# Patient Record
Sex: MALE | Race: WHITE | URBAN - METROPOLITAN AREA
[De-identification: names, ages, dates, MRNs, and addresses within clinical notes are randomized per-mention and may not be internally consistent; named-entity substitution may affect disease eponyms.]

---

## 2017-08-03 PROBLEM — N20.1 URETERAL STONE: Status: ACTIVE | Noted: 2017-08-03

## 2017-08-17 ENCOUNTER — HOSPITAL ENCOUNTER (OUTPATIENT)
Dept: GENERAL RADIOLOGY | Age: 57
Discharge: HOME | End: 2017-08-17
Attending: SPECIALIST | Admitting: SPECIALIST

## 2017-08-17 DIAGNOSIS — N20.0 CALCULUS, KIDNEY: ICD-10-CM

## 2018-04-05 ENCOUNTER — HOSPITAL ENCOUNTER (OUTPATIENT)
Dept: OTHER | Age: 58
Discharge: HOME | End: 2018-04-05
Attending: UROLOGY | Admitting: UROLOGY

## 2018-04-11 LAB
STONE COMPOSITION: NORMAL
STONE DESCRIPTION: NORMAL
STONE MASS: 44
STONE NUMBER: 3
STONE SIZE: NORMAL

## 2025-01-14 ENCOUNTER — APPOINTMENT (OUTPATIENT)
Dept: CT IMAGING | Age: 65
End: 2025-01-14
Payer: MEDICARE

## 2025-01-14 ENCOUNTER — APPOINTMENT (OUTPATIENT)
Dept: GENERAL RADIOLOGY | Age: 65
End: 2025-01-14
Payer: MEDICARE

## 2025-01-14 ENCOUNTER — HOSPITAL ENCOUNTER (INPATIENT)
Age: 65
LOS: 3 days | Discharge: HOME | End: 2025-01-17
Admitting: INTERNAL MEDICINE
Payer: MEDICARE

## 2025-01-14 DIAGNOSIS — J11.1 INFLUENZA: ICD-10-CM

## 2025-01-14 DIAGNOSIS — I47.10 PAROXYSMAL SUPRAVENTRICULAR TACHYCARDIA (HCC): ICD-10-CM

## 2025-01-14 DIAGNOSIS — R07.9 CHEST PAIN, UNSPECIFIED TYPE: ICD-10-CM

## 2025-01-14 DIAGNOSIS — I47.10 SVT (SUPRAVENTRICULAR TACHYCARDIA) (HCC): Primary | ICD-10-CM

## 2025-01-14 DIAGNOSIS — I21.4 NSTEMI (NON-ST ELEVATED MYOCARDIAL INFARCTION) (HCC): ICD-10-CM

## 2025-01-14 DIAGNOSIS — N20.1 URETERAL STONE: ICD-10-CM

## 2025-01-14 LAB
ALBUMIN SERPL-MCNC: 4.8 G/DL (ref 3.4–5)
ALBUMIN/GLOB SERPL: 1.6 {RATIO} (ref 1.1–2.2)
ALP SERPL-CCNC: 69 U/L (ref 40–129)
ALT SERPL-CCNC: 73 U/L (ref 10–40)
ANION GAP SERPL CALCULATED.3IONS-SCNC: 14 MMOL/L (ref 9–17)
ANTI-XA UNFRAC HEPARIN: <0.1 IU/L
AST SERPL-CCNC: 78 U/L (ref 15–37)
BASOPHILS # BLD: 0.03 K/UL
BASOPHILS NFR BLD: 1 % (ref 0–1)
BILIRUB SERPL-MCNC: 0.7 MG/DL (ref 0–1)
BNP SERPL-MCNC: 1765 PG/ML (ref 0–125)
BUN SERPL-MCNC: 15 MG/DL (ref 7–20)
CALCIUM SERPL-MCNC: 9.8 MG/DL (ref 8.3–10.6)
CHLORIDE SERPL-SCNC: 99 MMOL/L (ref 99–110)
CO2 SERPL-SCNC: 24 MMOL/L (ref 21–32)
CREAT SERPL-MCNC: 1.2 MG/DL (ref 0.8–1.3)
D DIMER PPP FEU-MCNC: 2.8 UG/ML FEU (ref 0–0.46)
EOSINOPHIL # BLD: 0.01 K/UL
EOSINOPHILS RELATIVE PERCENT: 0 % (ref 0–3)
ERYTHROCYTE [DISTWIDTH] IN BLOOD BY AUTOMATED COUNT: 13.4 % (ref 11.7–14.9)
EST. AVERAGE GLUCOSE BLD GHB EST-MCNC: 139 MG/DL
GFR, ESTIMATED: 61 ML/MIN/1.73M2
GLUCOSE SERPL-MCNC: 160 MG/DL (ref 74–99)
HBA1C MFR BLD: 6.5 % (ref 4.2–6.3)
HCT VFR BLD AUTO: 54.5 % (ref 42–52)
HGB BLD-MCNC: 18.5 G/DL (ref 13.5–18)
IMM GRANULOCYTES # BLD AUTO: 0.02 K/UL
IMM GRANULOCYTES NFR BLD: 0 %
INFLUENZA A BY PCR: DETECTED
INFLUENZA B BY PCR: NOT DETECTED
INR PPP: 1.1
LYMPHOCYTES NFR BLD: 0.43 K/UL
LYMPHOCYTES RELATIVE PERCENT: 7 % (ref 24–44)
MCH RBC QN AUTO: 30.9 PG (ref 27–31)
MCHC RBC AUTO-ENTMCNC: 33.9 G/DL (ref 32–36)
MCV RBC AUTO: 91.1 FL (ref 78–100)
MONOCYTES NFR BLD: 1.24 K/UL
MONOCYTES NFR BLD: 19 % (ref 0–4)
NEUTROPHILS NFR BLD: 73 % (ref 36–66)
NEUTS SEG NFR BLD: 4.81 K/UL
PARTIAL THROMBOPLASTIN TIME: 33.5 SEC (ref 25.1–37.1)
PLATELET # BLD AUTO: 178 K/UL (ref 140–440)
PMV BLD AUTO: 10.7 FL (ref 7.5–11.1)
POTASSIUM SERPL-SCNC: 4.8 MMOL/L (ref 3.5–5.1)
PROT SERPL-MCNC: 7.9 G/DL (ref 6.4–8.2)
PROTHROMBIN TIME: 14.6 SEC (ref 11.7–14.5)
RBC # BLD AUTO: 5.98 M/UL (ref 4.6–6.2)
SARS-COV-2 RDRP RESP QL NAA+PROBE: NOT DETECTED
SODIUM SERPL-SCNC: 136 MMOL/L (ref 136–145)
SPECIMEN DESCRIPTION: NORMAL
TROPONIN I SERPL HS-MCNC: 171 NG/L (ref 0–22)
TROPONIN I SERPL HS-MCNC: 191 NG/L (ref 0–22)
TROPONIN I SERPL HS-MCNC: 229 NG/L (ref 0–22)
TSH SERPL DL<=0.05 MIU/L-ACNC: 0.98 UIU/ML (ref 0.27–4.2)
WBC OTHER # BLD: 6.5 K/UL (ref 4–10.5)

## 2025-01-14 PROCEDURE — 87635 SARS-COV-2 COVID-19 AMP PRB: CPT

## 2025-01-14 PROCEDURE — 94761 N-INVAS EAR/PLS OXIMETRY MLT: CPT

## 2025-01-14 PROCEDURE — 99285 EMERGENCY DEPT VISIT HI MDM: CPT

## 2025-01-14 PROCEDURE — 96374 THER/PROPH/DIAG INJ IV PUSH: CPT

## 2025-01-14 PROCEDURE — 85025 COMPLETE CBC W/AUTO DIFF WBC: CPT

## 2025-01-14 PROCEDURE — 6360000004 HC RX CONTRAST MEDICATION

## 2025-01-14 PROCEDURE — 80053 COMPREHEN METABOLIC PANEL: CPT

## 2025-01-14 PROCEDURE — 83036 HEMOGLOBIN GLYCOSYLATED A1C: CPT

## 2025-01-14 PROCEDURE — 85379 FIBRIN DEGRADATION QUANT: CPT

## 2025-01-14 PROCEDURE — 93005 ELECTROCARDIOGRAM TRACING: CPT

## 2025-01-14 PROCEDURE — 85610 PROTHROMBIN TIME: CPT

## 2025-01-14 PROCEDURE — 85730 THROMBOPLASTIN TIME PARTIAL: CPT

## 2025-01-14 PROCEDURE — 83880 ASSAY OF NATRIURETIC PEPTIDE: CPT

## 2025-01-14 PROCEDURE — 2500000003 HC RX 250 WO HCPCS

## 2025-01-14 PROCEDURE — 71275 CT ANGIOGRAPHY CHEST: CPT

## 2025-01-14 PROCEDURE — 84443 ASSAY THYROID STIM HORMONE: CPT

## 2025-01-14 PROCEDURE — 71046 X-RAY EXAM CHEST 2 VIEWS: CPT

## 2025-01-14 PROCEDURE — 87502 INFLUENZA DNA AMP PROBE: CPT

## 2025-01-14 PROCEDURE — 99223 1ST HOSP IP/OBS HIGH 75: CPT | Performed by: INTERNAL MEDICINE

## 2025-01-14 PROCEDURE — 85520 HEPARIN ASSAY: CPT

## 2025-01-14 PROCEDURE — 84484 ASSAY OF TROPONIN QUANT: CPT

## 2025-01-14 PROCEDURE — 36415 COLL VENOUS BLD VENIPUNCTURE: CPT

## 2025-01-14 PROCEDURE — 2060000000 HC ICU INTERMEDIATE R&B

## 2025-01-14 PROCEDURE — 6360000002 HC RX W HCPCS

## 2025-01-14 RX ORDER — HEPARIN SODIUM 10000 [USP'U]/100ML
5-30 INJECTION, SOLUTION INTRAVENOUS CONTINUOUS
Status: DISCONTINUED | OUTPATIENT
Start: 2025-01-14 | End: 2025-01-15

## 2025-01-14 RX ORDER — HEPARIN SODIUM 1000 [USP'U]/ML
80 INJECTION, SOLUTION INTRAVENOUS; SUBCUTANEOUS PRN
Status: DISCONTINUED | OUTPATIENT
Start: 2025-01-15 | End: 2025-01-15

## 2025-01-14 RX ORDER — DILTIAZEM HYDROCHLORIDE 5 MG/ML
15 INJECTION INTRAVENOUS ONCE
Status: COMPLETED | OUTPATIENT
Start: 2025-01-14 | End: 2025-01-14

## 2025-01-14 RX ORDER — 0.9 % SODIUM CHLORIDE 0.9 %
1000 INTRAVENOUS SOLUTION INTRAVENOUS ONCE
Status: DISCONTINUED | OUTPATIENT
Start: 2025-01-14 | End: 2025-01-14

## 2025-01-14 RX ORDER — PROMETHAZINE HYDROCHLORIDE 25 MG/ML
12.5 INJECTION, SOLUTION INTRAMUSCULAR; INTRAVENOUS ONCE
Status: DISCONTINUED | OUTPATIENT
Start: 2025-01-14 | End: 2025-01-14

## 2025-01-14 RX ORDER — HEPARIN SODIUM 1000 [USP'U]/ML
40 INJECTION, SOLUTION INTRAVENOUS; SUBCUTANEOUS PRN
Status: DISCONTINUED | OUTPATIENT
Start: 2025-01-15 | End: 2025-01-15

## 2025-01-14 RX ORDER — IOPAMIDOL 755 MG/ML
80 INJECTION, SOLUTION INTRAVASCULAR
Status: COMPLETED | OUTPATIENT
Start: 2025-01-14 | End: 2025-01-14

## 2025-01-14 RX ORDER — HEPARIN SODIUM 1000 [USP'U]/ML
80 INJECTION, SOLUTION INTRAVENOUS; SUBCUTANEOUS ONCE
Status: COMPLETED | OUTPATIENT
Start: 2025-01-14 | End: 2025-01-14

## 2025-01-14 RX ADMIN — DILTIAZEM HYDROCHLORIDE 15 MG: 5 INJECTION INTRAVENOUS at 16:56

## 2025-01-14 RX ADMIN — HEPARIN SODIUM 8200 UNITS: 1000 INJECTION INTRAVENOUS; SUBCUTANEOUS at 21:38

## 2025-01-14 RX ADMIN — IOPAMIDOL 80 ML: 755 INJECTION, SOLUTION INTRAVENOUS at 19:56

## 2025-01-14 RX ADMIN — HEPARIN SODIUM 18 UNITS/KG/HR: 10000 INJECTION, SOLUTION INTRAVENOUS at 21:41

## 2025-01-14 ASSESSMENT — PAIN - FUNCTIONAL ASSESSMENT: PAIN_FUNCTIONAL_ASSESSMENT: NONE - DENIES PAIN

## 2025-01-14 NOTE — CONSULTS
Name:  Kian Frias /Age/Sex: 1960  (64 y.o. male)   MRN & CSN:  [id number] & [id number] Admission Date/Time: 2025  4:11 PM   Location:   PCP: Rajeev Merida MD       Hospital Day: 1          Referring physician:  No admitting provider for patient encounter.         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    Information source: Patient    CC; tachycardia      HPI:   Thank you for involving me in taking  care of Kian Frias who  is a 64 y.o.year  Old male  Presents with no cardiac history has been sick for few days felt heart racing came to the ER was in SVT was given adenosine and now is converted to sinus  Has been having a cough on and off denies any recent travel                     Past medical history:    has a past medical history of Heart murmur, Hyperlipidemia, Kidney stone, and Sleep apnea.  Past surgical history:   has a past surgical history that includes Cystoscopy; Colonoscopy (); and Cystocopy (N/A, 2017).  Social History:   reports that he quit smoking about 18 years ago. His smoking use included cigarettes. He started smoking about 48 years ago. He has a 30 pack-year smoking history. He has never used smokeless tobacco. He reports current alcohol use. He reports that he does not use drugs.  Family history:  family history includes Diabetes in his mother; Heart Disease in his mother; High Blood Pressure in his father.    Allergies   Allergen Reactions    Zocor [Simvastatin] Other (See Comments)     Muscle pain       No current facility-administered medications for this encounter.    No current facility-administered medications for this encounter.     Current Outpatient Medications   Medication Sig Dispense Refill    aspirin 81 MG tablet Take 1 tablet by mouth daily      Multiple Vitamins-Minerals (THERAPEUTIC MULTIVITAMIN-MINERALS) tablet Take 1 tablet by mouth daily

## 2025-01-15 ENCOUNTER — APPOINTMENT (OUTPATIENT)
Dept: ULTRASOUND IMAGING | Age: 65
End: 2025-01-15
Payer: MEDICARE

## 2025-01-15 ENCOUNTER — APPOINTMENT (OUTPATIENT)
Dept: NON INVASIVE DIAGNOSTICS | Age: 65
End: 2025-01-15
Attending: INTERNAL MEDICINE
Payer: MEDICARE

## 2025-01-15 LAB
ABSOLUTE BANDS: 0.35 K/UL
ALBUMIN SERPL-MCNC: 4.2 G/DL (ref 3.4–5)
ALBUMIN/GLOB SERPL: 1.4 {RATIO} (ref 1.1–2.2)
ALP SERPL-CCNC: 57 U/L (ref 40–129)
ALT SERPL-CCNC: 57 U/L (ref 10–40)
ANION GAP SERPL CALCULATED.3IONS-SCNC: 13 MMOL/L (ref 9–17)
ANTI-XA UNFRAC HEPARIN: 0.1 IU/L
ANTI-XA UNFRAC HEPARIN: 0.83 IU/L
ANTI-XA UNFRAC HEPARIN: <0.1 IU/L
ANTI-XA UNFRAC HEPARIN: >1.1 IU/L
ARTERIAL PATENCY WRIST A: ABNORMAL
AST SERPL-CCNC: 66 U/L (ref 15–37)
BANDS: 6 %
BASOPHILS # BLD: 0 K/UL
BASOPHILS NFR BLD: 0 % (ref 0–1)
BILIRUB DIRECT SERPL-MCNC: 0.3 MG/DL (ref 0–0.3)
BILIRUB INDIRECT SERPL-MCNC: 0.4 MG/DL (ref 0–0.7)
BILIRUB SERPL-MCNC: 0.7 MG/DL (ref 0–1)
BODY TEMPERATURE: 37
BUN SERPL-MCNC: 18 MG/DL (ref 7–20)
CALCIUM SERPL-MCNC: 9.1 MG/DL (ref 8.3–10.6)
CHLORIDE SERPL-SCNC: 101 MMOL/L (ref 99–110)
CHOLEST SERPL-MCNC: 155 MG/DL (ref 125–199)
CO2 SERPL-SCNC: 23 MMOL/L (ref 21–32)
COHGB MFR BLD: 0.2 % (ref 0.5–1.5)
CREAT SERPL-MCNC: 1.1 MG/DL (ref 0.8–1.3)
ECHO AO ROOT DIAM: 2.4 CM
ECHO AO ROOT INDEX: 1.1 CM/M2
ECHO AV AREA PEAK VELOCITY: 2.1 CM2
ECHO AV AREA VTI: 3.1 CM2
ECHO AV AREA/BSA PEAK VELOCITY: 1 CM2/M2
ECHO AV AREA/BSA VTI: 1.4 CM2/M2
ECHO AV MEAN GRADIENT: 3 MMHG
ECHO AV MEAN VELOCITY: 0.9 M/S
ECHO AV PEAK GRADIENT: 5 MMHG
ECHO AV PEAK VELOCITY: 1.2 M/S
ECHO AV VELOCITY RATIO: 0.75
ECHO AV VTI: 16.9 CM
ECHO BSA: 2.24 M2
ECHO BSA: 2.24 M2
ECHO LA AREA 4C: 14.6 CM2
ECHO LA DIAMETER INDEX: 1.14 CM/M2
ECHO LA DIAMETER: 2.5 CM
ECHO LA MAJOR AXIS: 4.6 CM
ECHO LA TO AORTIC ROOT RATIO: 1.04
ECHO LA VOL MOD A4C: 36 ML (ref 18–58)
ECHO LA VOLUME INDEX MOD A4C: 16 ML/M2 (ref 16–34)
ECHO LV E' LATERAL VELOCITY: 8.2 CM/S
ECHO LV E' SEPTAL VELOCITY: 8.1 CM/S
ECHO LV EDV A4C: 56 ML
ECHO LV EDV INDEX A4C: 26 ML/M2
ECHO LV EF PHYSICIAN: 60 %
ECHO LV EJECTION FRACTION A4C: 57 %
ECHO LV ESV A4C: 24 ML
ECHO LV ESV INDEX A4C: 11 ML/M2
ECHO LV FRACTIONAL SHORTENING: 49 % (ref 28–44)
ECHO LV INTERNAL DIMENSION DIASTOLE INDEX: 1.6 CM/M2
ECHO LV INTERNAL DIMENSION DIASTOLIC: 3.5 CM (ref 4.2–5.9)
ECHO LV INTERNAL DIMENSION SYSTOLIC INDEX: 0.82 CM/M2
ECHO LV INTERNAL DIMENSION SYSTOLIC: 1.8 CM
ECHO LV IVSD: 1.2 CM (ref 0.6–1)
ECHO LV MASS 2D: 135.8 G (ref 88–224)
ECHO LV MASS INDEX 2D: 62 G/M2 (ref 49–115)
ECHO LV POSTERIOR WALL DIASTOLIC: 1.2 CM (ref 0.6–1)
ECHO LV RELATIVE WALL THICKNESS RATIO: 0.69
ECHO LVOT AREA: 2.8 CM2
ECHO LVOT AV VTI INDEX: 1.09
ECHO LVOT DIAM: 1.9 CM
ECHO LVOT MEAN GRADIENT: 2 MMHG
ECHO LVOT PEAK GRADIENT: 3 MMHG
ECHO LVOT PEAK VELOCITY: 0.9 M/S
ECHO LVOT STROKE VOLUME INDEX: 23.9 ML/M2
ECHO LVOT SV: 52.4 ML
ECHO LVOT VTI: 18.5 CM
ECHO MV A VELOCITY: 0.7 M/S
ECHO MV E VELOCITY: 0.49 M/S
ECHO MV E/A RATIO: 0.7
ECHO MV E/E' LATERAL: 5.98
ECHO MV E/E' RATIO (AVERAGED): 6.01
ECHO MV E/E' SEPTAL: 6.05
ECHO RV MID DIMENSION: 2.9 CM
EKG ATRIAL RATE: 82 BPM
EKG ATRIAL RATE: 85 BPM
EKG DIAGNOSIS: NORMAL
EKG P AXIS: 53 DEGREES
EKG P AXIS: 55 DEGREES
EKG P-R INTERVAL: 122 MS
EKG P-R INTERVAL: 148 MS
EKG Q-T INTERVAL: 286 MS
EKG Q-T INTERVAL: 336 MS
EKG Q-T INTERVAL: 382 MS
EKG QRS DURATION: 84 MS
EKG QRS DURATION: 86 MS
EKG QRS DURATION: 88 MS
EKG QTC CALCULATION (BAZETT): 392 MS
EKG QTC CALCULATION (BAZETT): 450 MS
EKG QTC CALCULATION (BAZETT): 454 MS
EKG R AXIS: -31 DEGREES
EKG R AXIS: -37 DEGREES
EKG R AXIS: -42 DEGREES
EKG T AXIS: 46 DEGREES
EKG T AXIS: 76 DEGREES
EKG T AXIS: 95 DEGREES
EKG VENTRICULAR RATE: 149 BPM
EKG VENTRICULAR RATE: 82 BPM
EKG VENTRICULAR RATE: 85 BPM
EOSINOPHIL # BLD: 0.06 K/UL
EOSINOPHILS RELATIVE PERCENT: 1 % (ref 0–3)
ERYTHROCYTE [DISTWIDTH] IN BLOOD BY AUTOMATED COUNT: 13.2 % (ref 11.7–14.9)
FIO2 ON VENT: 21 %
GFR, ESTIMATED: 71 ML/MIN/1.73M2
GLUCOSE SERPL-MCNC: 133 MG/DL (ref 74–99)
HCO3 ARTERIAL: 24.2 MMOL/L (ref 21–28)
HCT VFR BLD AUTO: 49.6 % (ref 42–52)
HDLC SERPL-MCNC: 45 MG/DL
HGB BLD-MCNC: 17.2 G/DL (ref 13.5–18)
LDLC SERPL CALC-MCNC: 98 MG/DL
LYMPHOCYTES NFR BLD: 1 K/UL
LYMPHOCYTES RELATIVE PERCENT: 17 % (ref 24–44)
MAGNESIUM SERPL-MCNC: 2 MG/DL (ref 1.8–2.4)
MCH RBC QN AUTO: 31.4 PG (ref 27–31)
MCHC RBC AUTO-ENTMCNC: 34.7 G/DL (ref 32–36)
MCV RBC AUTO: 90.7 FL (ref 78–100)
METHEMOGLOBIN: 0.5 % (ref 0.5–1.5)
MONOCYTES NFR BLD: 1.48 K/UL
MONOCYTES NFR BLD: 25 % (ref 0–4)
NEGATIVE BASE EXCESS, ART: 1.3 MMOL/L (ref 0–3)
NEUTROPHILS NFR BLD: 51 % (ref 36–66)
NEUTS SEG NFR BLD: 3.01 K/UL
OXYHGB MFR BLD: 94 %
PARTIAL THROMBOPLASTIN TIME: 198.3 SEC (ref 25.1–37.1)
PCO2 ARTERIAL: 43.3 MMHG (ref 35–48)
PH ARTERIAL: 7.37 (ref 7.35–7.45)
PLATELET # BLD AUTO: 170 K/UL (ref 140–440)
PLATELET ESTIMATE: ABNORMAL
PLATELET ESTIMATE: NORMAL
PMV BLD AUTO: 10.7 FL (ref 7.5–11.1)
PO2 ARTERIAL: 74.9 MMHG (ref 83–108)
POTASSIUM SERPL-SCNC: 3.9 MMOL/L (ref 3.5–5.1)
PROT SERPL-MCNC: 7.2 G/DL (ref 6.4–8.2)
RBC # BLD AUTO: 5.47 M/UL (ref 4.6–6.2)
RBC # BLD: NORMAL 10*6/UL
SODIUM SERPL-SCNC: 138 MMOL/L (ref 136–145)
TRIGL SERPL-MCNC: 62 MG/DL
TROPONIN I SERPL HS-MCNC: 225 NG/L (ref 0–22)
WBC # BLD: NORMAL 10*3/UL
WBC OTHER # BLD: 5.9 K/UL (ref 4–10.5)

## 2025-01-15 PROCEDURE — 82805 BLOOD GASES W/O2 SATURATION: CPT

## 2025-01-15 PROCEDURE — 2500000003 HC RX 250 WO HCPCS: Performed by: INTERNAL MEDICINE

## 2025-01-15 PROCEDURE — 99152 MOD SED SAME PHYS/QHP 5/>YRS: CPT | Performed by: INTERNAL MEDICINE

## 2025-01-15 PROCEDURE — 6360000002 HC RX W HCPCS: Performed by: INTERNAL MEDICINE

## 2025-01-15 PROCEDURE — 93010 ELECTROCARDIOGRAM REPORT: CPT | Performed by: INTERNAL MEDICINE

## 2025-01-15 PROCEDURE — 6370000000 HC RX 637 (ALT 250 FOR IP): Performed by: INTERNAL MEDICINE

## 2025-01-15 PROCEDURE — 85730 THROMBOPLASTIN TIME PARTIAL: CPT

## 2025-01-15 PROCEDURE — 2580000003 HC RX 258: Performed by: INTERNAL MEDICINE

## 2025-01-15 PROCEDURE — 2709999900 HC NON-CHARGEABLE SUPPLY: Performed by: INTERNAL MEDICINE

## 2025-01-15 PROCEDURE — 85025 COMPLETE CBC W/AUTO DIFF WBC: CPT

## 2025-01-15 PROCEDURE — 2500000003 HC RX 250 WO HCPCS

## 2025-01-15 PROCEDURE — C1894 INTRO/SHEATH, NON-LASER: HCPCS | Performed by: INTERNAL MEDICINE

## 2025-01-15 PROCEDURE — 83735 ASSAY OF MAGNESIUM: CPT

## 2025-01-15 PROCEDURE — 6360000004 HC RX CONTRAST MEDICATION: Performed by: INTERNAL MEDICINE

## 2025-01-15 PROCEDURE — 85520 HEPARIN ASSAY: CPT

## 2025-01-15 PROCEDURE — 99232 SBSQ HOSP IP/OBS MODERATE 35: CPT | Performed by: INTERNAL MEDICINE

## 2025-01-15 PROCEDURE — 93458 L HRT ARTERY/VENTRICLE ANGIO: CPT | Performed by: INTERNAL MEDICINE

## 2025-01-15 PROCEDURE — 85347 COAGULATION TIME ACTIVATED: CPT | Performed by: INTERNAL MEDICINE

## 2025-01-15 PROCEDURE — 80053 COMPREHEN METABOLIC PANEL: CPT

## 2025-01-15 PROCEDURE — 37799 UNLISTED PX VASCULAR SURGERY: CPT

## 2025-01-15 PROCEDURE — 94761 N-INVAS EAR/PLS OXIMETRY MLT: CPT

## 2025-01-15 PROCEDURE — 84484 ASSAY OF TROPONIN QUANT: CPT

## 2025-01-15 PROCEDURE — 93306 TTE W/DOPPLER COMPLETE: CPT

## 2025-01-15 PROCEDURE — APPNB15 APP NON BILLABLE TIME 0-15 MINS

## 2025-01-15 PROCEDURE — C1769 GUIDE WIRE: HCPCS | Performed by: INTERNAL MEDICINE

## 2025-01-15 PROCEDURE — 2700000000 HC OXYGEN THERAPY PER DAY

## 2025-01-15 PROCEDURE — 93306 TTE W/DOPPLER COMPLETE: CPT | Performed by: INTERNAL MEDICINE

## 2025-01-15 PROCEDURE — 93005 ELECTROCARDIOGRAM TRACING: CPT | Performed by: INTERNAL MEDICINE

## 2025-01-15 PROCEDURE — B2151ZZ FLUOROSCOPY OF LEFT HEART USING LOW OSMOLAR CONTRAST: ICD-10-PCS | Performed by: INTERNAL MEDICINE

## 2025-01-15 PROCEDURE — 36415 COLL VENOUS BLD VENIPUNCTURE: CPT

## 2025-01-15 PROCEDURE — 1200000000 HC SEMI PRIVATE

## 2025-01-15 PROCEDURE — 82248 BILIRUBIN DIRECT: CPT

## 2025-01-15 PROCEDURE — 80061 LIPID PANEL: CPT

## 2025-01-15 PROCEDURE — B2111ZZ FLUOROSCOPY OF MULTIPLE CORONARY ARTERIES USING LOW OSMOLAR CONTRAST: ICD-10-PCS | Performed by: INTERNAL MEDICINE

## 2025-01-15 PROCEDURE — 6370000000 HC RX 637 (ALT 250 FOR IP)

## 2025-01-15 PROCEDURE — 4A023N7 MEASUREMENT OF CARDIAC SAMPLING AND PRESSURE, LEFT HEART, PERCUTANEOUS APPROACH: ICD-10-PCS | Performed by: INTERNAL MEDICINE

## 2025-01-15 RX ORDER — ONDANSETRON 4 MG/1
4 TABLET, ORALLY DISINTEGRATING ORAL EVERY 8 HOURS PRN
Status: DISCONTINUED | OUTPATIENT
Start: 2025-01-15 | End: 2025-01-17 | Stop reason: HOSPADM

## 2025-01-15 RX ORDER — SODIUM CHLORIDE 0.9 % (FLUSH) 0.9 %
5-40 SYRINGE (ML) INJECTION EVERY 12 HOURS SCHEDULED
Status: DISCONTINUED | OUTPATIENT
Start: 2025-01-15 | End: 2025-01-17 | Stop reason: HOSPADM

## 2025-01-15 RX ORDER — SODIUM CHLORIDE 0.9 % (FLUSH) 0.9 %
5-40 SYRINGE (ML) INJECTION PRN
Status: DISCONTINUED | OUTPATIENT
Start: 2025-01-15 | End: 2025-01-17 | Stop reason: HOSPADM

## 2025-01-15 RX ORDER — SODIUM CHLORIDE 9 MG/ML
INJECTION, SOLUTION INTRAVENOUS PRN
Status: DISCONTINUED | OUTPATIENT
Start: 2025-01-15 | End: 2025-01-17 | Stop reason: HOSPADM

## 2025-01-15 RX ORDER — ACETAMINOPHEN 325 MG/1
650 TABLET ORAL EVERY 6 HOURS PRN
Status: DISCONTINUED | OUTPATIENT
Start: 2025-01-15 | End: 2025-01-17 | Stop reason: HOSPADM

## 2025-01-15 RX ORDER — ASPIRIN 81 MG/1
81 TABLET, CHEWABLE ORAL DAILY
Status: DISCONTINUED | OUTPATIENT
Start: 2025-01-15 | End: 2025-01-17 | Stop reason: HOSPADM

## 2025-01-15 RX ORDER — IOPAMIDOL 755 MG/ML
INJECTION, SOLUTION INTRAVASCULAR PRN
Status: DISCONTINUED | OUTPATIENT
Start: 2025-01-15 | End: 2025-01-15 | Stop reason: HOSPADM

## 2025-01-15 RX ORDER — OSELTAMIVIR PHOSPHATE 75 MG/1
75 CAPSULE ORAL 2 TIMES DAILY
Status: DISCONTINUED | OUTPATIENT
Start: 2025-01-15 | End: 2025-01-17 | Stop reason: HOSPADM

## 2025-01-15 RX ORDER — HEPARIN SODIUM 200 [USP'U]/100ML
INJECTION, SOLUTION INTRAVENOUS PRN
Status: DISCONTINUED | OUTPATIENT
Start: 2025-01-15 | End: 2025-01-15 | Stop reason: HOSPADM

## 2025-01-15 RX ORDER — POTASSIUM CHLORIDE 7.45 MG/ML
10 INJECTION INTRAVENOUS PRN
Status: DISCONTINUED | OUTPATIENT
Start: 2025-01-15 | End: 2025-01-17 | Stop reason: HOSPADM

## 2025-01-15 RX ORDER — ACETAMINOPHEN 325 MG/1
650 TABLET ORAL EVERY 4 HOURS PRN
Status: DISCONTINUED | OUTPATIENT
Start: 2025-01-15 | End: 2025-01-17 | Stop reason: HOSPADM

## 2025-01-15 RX ORDER — DILTIAZEM HYDROCHLORIDE 30 MG/1
30 TABLET, FILM COATED ORAL EVERY 6 HOURS SCHEDULED
Status: DISCONTINUED | OUTPATIENT
Start: 2025-01-15 | End: 2025-01-15

## 2025-01-15 RX ORDER — HEPARIN SODIUM 1000 [USP'U]/ML
4000 INJECTION, SOLUTION INTRAVENOUS; SUBCUTANEOUS PRN
Status: DISCONTINUED | OUTPATIENT
Start: 2025-01-15 | End: 2025-01-16

## 2025-01-15 RX ORDER — HEPARIN SODIUM 10000 [USP'U]/100ML
5-30 INJECTION, SOLUTION INTRAVENOUS CONTINUOUS
Status: DISCONTINUED | OUTPATIENT
Start: 2025-01-15 | End: 2025-01-16

## 2025-01-15 RX ORDER — BENZONATATE 100 MG/1
100 CAPSULE ORAL 3 TIMES DAILY PRN
Status: DISCONTINUED | OUTPATIENT
Start: 2025-01-15 | End: 2025-01-17 | Stop reason: HOSPADM

## 2025-01-15 RX ORDER — HEPARIN SODIUM 1000 [USP'U]/ML
2000 INJECTION, SOLUTION INTRAVENOUS; SUBCUTANEOUS PRN
Status: DISCONTINUED | OUTPATIENT
Start: 2025-01-15 | End: 2025-01-16

## 2025-01-15 RX ORDER — FENTANYL CITRATE 50 UG/ML
INJECTION, SOLUTION INTRAMUSCULAR; INTRAVENOUS PRN
Status: DISCONTINUED | OUTPATIENT
Start: 2025-01-15 | End: 2025-01-15 | Stop reason: HOSPADM

## 2025-01-15 RX ORDER — POLYETHYLENE GLYCOL 3350 17 G/17G
17 POWDER, FOR SOLUTION ORAL DAILY PRN
Status: DISCONTINUED | OUTPATIENT
Start: 2025-01-15 | End: 2025-01-17 | Stop reason: HOSPADM

## 2025-01-15 RX ORDER — 0.9 % SODIUM CHLORIDE 0.9 %
500 INTRAVENOUS SOLUTION INTRAVENOUS ONCE
Status: COMPLETED | OUTPATIENT
Start: 2025-01-15 | End: 2025-01-15

## 2025-01-15 RX ORDER — POTASSIUM CHLORIDE 1500 MG/1
40 TABLET, EXTENDED RELEASE ORAL PRN
Status: DISCONTINUED | OUTPATIENT
Start: 2025-01-15 | End: 2025-01-17 | Stop reason: HOSPADM

## 2025-01-15 RX ORDER — ACETAMINOPHEN 650 MG/1
650 SUPPOSITORY RECTAL EVERY 6 HOURS PRN
Status: DISCONTINUED | OUTPATIENT
Start: 2025-01-15 | End: 2025-01-17 | Stop reason: HOSPADM

## 2025-01-15 RX ORDER — METOPROLOL TARTRATE 25 MG/1
25 TABLET, FILM COATED ORAL 2 TIMES DAILY
Status: DISCONTINUED | OUTPATIENT
Start: 2025-01-15 | End: 2025-01-16

## 2025-01-15 RX ORDER — MIDAZOLAM HYDROCHLORIDE 1 MG/ML
INJECTION, SOLUTION INTRAMUSCULAR; INTRAVENOUS PRN
Status: DISCONTINUED | OUTPATIENT
Start: 2025-01-15 | End: 2025-01-15 | Stop reason: HOSPADM

## 2025-01-15 RX ORDER — 0.9 % SODIUM CHLORIDE 0.9 %
INTRAVENOUS SOLUTION INTRAVENOUS CONTINUOUS PRN
Status: COMPLETED | OUTPATIENT
Start: 2025-01-15 | End: 2025-01-15

## 2025-01-15 RX ORDER — HYDRALAZINE HYDROCHLORIDE 20 MG/ML
10 INJECTION INTRAMUSCULAR; INTRAVENOUS EVERY 10 MIN PRN
Status: DISCONTINUED | OUTPATIENT
Start: 2025-01-15 | End: 2025-01-17 | Stop reason: HOSPADM

## 2025-01-15 RX ORDER — ONDANSETRON 2 MG/ML
4 INJECTION INTRAMUSCULAR; INTRAVENOUS EVERY 6 HOURS PRN
Status: DISCONTINUED | OUTPATIENT
Start: 2025-01-15 | End: 2025-01-17 | Stop reason: HOSPADM

## 2025-01-15 RX ORDER — MAGNESIUM SULFATE IN WATER 40 MG/ML
2000 INJECTION, SOLUTION INTRAVENOUS PRN
Status: DISCONTINUED | OUTPATIENT
Start: 2025-01-15 | End: 2025-01-17 | Stop reason: HOSPADM

## 2025-01-15 RX ADMIN — HEPARIN SODIUM 9 UNITS/KG/HR: 10000 INJECTION, SOLUTION INTRAVENOUS at 18:49

## 2025-01-15 RX ADMIN — SODIUM CHLORIDE, PRESERVATIVE FREE 10 ML: 5 INJECTION INTRAVENOUS at 09:42

## 2025-01-15 RX ADMIN — SODIUM CHLORIDE 500 ML: 9 INJECTION, SOLUTION INTRAVENOUS at 16:19

## 2025-01-15 RX ADMIN — SODIUM CHLORIDE, PRESERVATIVE FREE 10 ML: 5 INJECTION INTRAVENOUS at 20:42

## 2025-01-15 RX ADMIN — METOPROLOL TARTRATE 25 MG: 25 TABLET, FILM COATED ORAL at 20:41

## 2025-01-15 RX ADMIN — DILTIAZEM HYDROCHLORIDE 30 MG: 30 TABLET, FILM COATED ORAL at 10:01

## 2025-01-15 RX ADMIN — OSELTAMIVIR PHOSPHATE 75 MG: 75 CAPSULE ORAL at 01:21

## 2025-01-15 RX ADMIN — BENZONATATE 100 MG: 100 CAPSULE ORAL at 01:21

## 2025-01-15 RX ADMIN — HEPARIN SODIUM 15 UNITS/KG/HR: 10000 INJECTION, SOLUTION INTRAVENOUS at 05:41

## 2025-01-15 RX ADMIN — OSELTAMIVIR PHOSPHATE 75 MG: 75 CAPSULE ORAL at 10:01

## 2025-01-15 RX ADMIN — OSELTAMIVIR PHOSPHATE 75 MG: 75 CAPSULE ORAL at 20:41

## 2025-01-15 RX ADMIN — HEPARIN SODIUM 9 UNITS/KG/HR: 10000 INJECTION, SOLUTION INTRAVENOUS at 08:48

## 2025-01-15 RX ADMIN — ASPIRIN 81 MG CHEWABLE TABLET 81 MG: 81 TABLET CHEWABLE at 11:36

## 2025-01-15 ASSESSMENT — LIFESTYLE VARIABLES
HOW MANY STANDARD DRINKS CONTAINING ALCOHOL DO YOU HAVE ON A TYPICAL DAY: PATIENT DOES NOT DRINK
HOW OFTEN DO YOU HAVE A DRINK CONTAINING ALCOHOL: NEVER

## 2025-01-15 ASSESSMENT — PAIN - FUNCTIONAL ASSESSMENT
PAIN_FUNCTIONAL_ASSESSMENT: NONE - DENIES PAIN
PAIN_FUNCTIONAL_ASSESSMENT: NONE - DENIES PAIN

## 2025-01-15 NOTE — ED PROVIDER NOTES
Patient care was assumed from Dr. Philippe during shift change this evening.  I supposed to follow-up CT pulmonary angiogram for PE which is pending during shift change.  Patient came in the emergency department with rapid heart rate with chest pain.  He apparently was diagnosed with SVT but is spontaneously cardioverted to normal sinus rhythm.  He is having troponin that is trending up and after consultation with the cardiologist patient is heparinized already.  CT is concerned that her left official report but per my review I do not see central PE.  At this point the plan is to admit the patient by the hospitalist service for further care recommendations.  Apart from dry cough patient is currently asymptomatic denying chest or abdominal pain or shortness of breath.  He does not admit to leg pain or leg swelling either     Adam Dunlap MD  01/14/25 5327    
embolism based off of it.  Will get CTA.  CTA was without findings of acute pulmonary embolism.  Discussed with hospitalist and will admit.    ED Course as of 01/18/25 0701   Tue Jan 14, 2025 1705 Discussed with cardiology, evaluated patient.  Recommended inpatient and they will consult EP [CC]   1725 After Cardizem, patient on reevaluation heart rate is improved.  Now heart rate in the 77-85.  Normal sinus rhythm on monitor. [CC]   1734 Troponin, High Sensitivity(!!): 171 [CC]   1804 D-Dimer, Quant(!): 2.80  Will get CTA of chest [CC]   1811 Influenza A by PCR(!): DETECTED [CC]   2106 Troponin, High Sensitivity(!!): 229  Discussed up trending trop with cardiology will start heparin. [CC]      ED Course User Index  [CC] Vishnu Philippe, DO     Troponins were trending up, I discussed this with cardiology and we will start on heparin drip.    History from : Patient    Limitations to history : None    Patient was given the following medications:  Medications   dilTIAZem injection 15 mg (15 mg IntraVENous Given 1/14/25 1656)   iopamidol (ISOVUE-370) 76 % injection 80 mL (80 mLs IntraVENous Given 1/14/25 1956)   heparin (porcine) injection 8,200 Units (8,200 Units IntraVENous Given 1/14/25 2138)   sodium chloride 0.9 % bolus (250 mLs IntraVENous New Bag 1/15/25 1423)   sodium chloride 0.9 % bolus 500 mL (0 mLs IntraVENous Stopped 1/15/25 1915)   enoxaparin (LOVENOX) injection 100 mg (100 mg SubCUTAneous Given 1/16/25 1250)   enoxaparin (LOVENOX) injection 100 mg (100 mg SubCUTAneous Given 1/17/25 0820)       Independent Imaging Interpretation by me:   CXR - without evidence of acute cardiopulmonary disease process      EKG (if obtained): (All EKG's are interpreted by myself in the absence of a cardiologist)   EKG as interpreted by me  1623  SVT at 149 bpm  Left axis deviation  Likely LVH  No STEMI    Repeat EKG  1705  Normal sinus rhythm at 82 bpm  Left axis deviation  Increased voltage suggesting LVH  Nonspecific T

## 2025-01-15 NOTE — PROGRESS NOTES
4 Eyes Skin Assessment     NAME:  Kian Frias  YOB: 1960  MEDICAL RECORD NUMBER:  [id number]    The patient is being assessed for  Admission    I agree that at least one RN has performed a thorough Head to Toe Skin Assessment on the patient. ALL assessment sites listed below have been assessed.      Areas assessed by both nurses:    Head, Face, Ears, Shoulders, Back, Chest, Arms, Elbows, Hands, Sacrum. Buttock, Coccyx, Ischium, Legs. Feet and Heels, and Under Medical Devices         Does the Patient have a Wound? No noted wound(s)       Fred Prevention initiated by RN: No  Wound Care Orders initiated by RN: No    Pressure Injury (Stage 3,4, Unstageable, DTI, NWPT, and Complex wounds) if present, place Wound referral order by RN under : No    New Ostomies, if present place, Ostomy referral order under : No     Nurse 1 eSignature: Electronically signed by Pippa Murdock RN on 1/15/25 at 12:39 PM EST    **SHARE this note so that the co-signing nurse can place an eSignature**    Nurse 2 eSignature: Electronically signed by Romi Aponte RN on 1/15/25 at 12:40 PM EST

## 2025-01-15 NOTE — ED NOTES
Pt resting in bed comfortably w/ eyes open. Pt is AxO x3 during assessment. Pt sating at 98% on 2 L NC, 80s HR, on monitor. Pt denies any CP/SOB/generalized pain during assessment. VSS. Will continue with current plan of care.

## 2025-01-15 NOTE — PROGRESS NOTES
Plan cath  Alternates and risk of the procedure were dicussed in detail  Patient is in agreement to proceed  Mallampati is 2  ASA is  3

## 2025-01-15 NOTE — PROGRESS NOTES
Spiritual Health History and Assessment/Progress Note  [facility]    Spiritual/Emotional Needs,  ,  ,      Name: Kian Frias MRN: [id number]    Age: 64 y.o.     Sex: male   Language: English   Judaism: None   SVT (supraventricular tachycardia) (HCC)     Date: 1/15/2025            Total Time Calculated: 6 min              Spiritual Assessment began in [facility] ICU STEPDOWN        Referral/Consult From: Rounding   Encounter Overview/Reason: Spiritual/Emotional Needs  Service Provided For: Patient and family together    Lelo, Belief, Meaning:   Patient identifies as spiritual  Family/Friends identify as spiritual      Importance and Influence:  Patient has spiritual/personal beliefs that influence decisions regarding their health  Family/Friends have spiritual/personal beliefs that influence decisions regarding the patient's health    Community:  Patient feels well-supported. Support system includes: Parent/s and Extended family  Family/Friends feel well-supported. Support system includes: Parent/s and Extended family    Assessment and Plan of Care:     Patient Interventions include: Facilitated expression of thoughts and feelings  Family/Friends Interventions include: Facilitated expression of thoughts and feelings    Patient Plan of Care: Spiritual Care available upon further referral  Family/Friends Plan of Care: Spiritual Care available upon further referral    Electronically signed by Chaplain Curtis on 1/15/2025 at 3:52 PM

## 2025-01-15 NOTE — ED NOTES
Pt resting on cart, denies needs at this time.  Pt denies CP/SOB at this time; resting comfortably

## 2025-01-15 NOTE — PROGRESS NOTES
[place] [address]  Phone: [phone]    Fax [phone]                  Samra Alamo MD, [facility]       Oscar Del Toro MD, [facility]  Katey Lange MD, [facility]    MD Yuriy Lincoln MD Tariq Rizvi, MD Bilal Alam, MD Dr. Waseem Sajjad MD Melissa Kellis, APRRHETT Sanchez, APRRHETT Romero, APRRHETT Santos, APRN  TIAN [facility]    CARDIOLOGY  NOTE      Name:  Kian Frias /Age/Sex: 1960  (64 y.o. male)   MRN & CSN:  [id number] & [id number] Admission Date/Time: 2025  4:11 PM   Location:  ED27/ED-27 PCP: Rajeev Merida MD       Hospital Day: 2    - Cardiology consult is for: SVT      ASSESSMENT/ PLAN:  Supraventricular tachycardia, likely AVNRT  Elevated troponin  -No arrhythmia overnight  -Goal potassium 4.0-4.5, magnesium 2.0-2.2. Replete per protocol   -TSH within normal limits  -EKG is nonischemic.  Troponin initially 171 and noris to 229  -No prior cardiac history, however does have family history of CAD  -Echocardiogram showing %, no wall motion abnormalities  -Will plan for heart catheterization today.  -Start Cardizem 30 mg every 6 hours  -Heparin drip.  Start aspirin  Influenza  -Likely provoked SVT  New onset diabetes mellitus  -Hemoglobin A1c 6.5 today  History of tobacco abuse: Has not smoked for over 15 years          Subjective:  Kian is a 64 y.o.year old     Educated patient on need for left heart catheterization.  Procedure was explained in detail as well as risks and benefits.  Patient voiced understanding and was agreeable to undergoing procedure.  Consent was obtained.     Plan for left heart catheterization today       Objective: Temperature:  Current - Temp: 98.3 °F (36.8 °C); Max - Temp  Av.3 °F (36.8 °C)  Min: 98.2 °F (36.8 °C)  Max: 98.3 °F (36.8 °C)    Respiratory Rate : Resp  Av.5  Min: 15  Max: 36    Pulse  edema  Pulses; palpable  Neuro: grossly normal      MEDICAL DECISION MAKING;    Pt assessed , chart reviewed, patient examined examined , all available data was reviewed, following is the plan which was discussed with NINA as well:    Patient's troponin are markedly elevated and patient had some chest discomfort and will proceed with cardiac catheterization    Supraventricular tachycardia probably AVNRT converted to sinus with adenosine at the present time he is in sinus rhythm we will continue to monitor  Cardizem added will continue     Will also rule out any other possible etiologies such as pulmonary embolism with a CTA chest     Will obtain echocardiogram for further clarification and check ejection fraction as well     Serial troponins are being monitored as well     Also need to rule out endocrinopathy such as thyroid issues    Underwent cardiac cath which showed severe triple-vessel disease will consult cardiothoracic surgery                AD VELÁZQUEZ MD [facility]

## 2025-01-15 NOTE — ED NOTES
ED TO INPATIENT SBAR HANDOFF    Patient Name: Kian Frias   :  1960  64 y.o.   Preferred Name  Kian  Family/Caregiver Present no   Restraints no   C-SSRS: Risk of Suicide: No Risk  Sitter no   Sepsis Risk Score        Situation  Chief Complaint   Patient presents with    Shortness of Breath     Patient reports he was sent by his PCP due to shortness of breath, wheezing and abnormal EKG.  Patient states he has been taking over the counter cough medicine for a long time.     Brief Description of Patient's Condition: Pt presented to ED w/ productive cough (small amount of yellow, thick   Mental Status: oriented, alert, logical, thought processes intact, and able to concentrate and follow conversation  Arrived from: home    Imaging:   CTA PULMONARY W CONTRAST   Final Result      XR CHEST (2 VW)   Final Result        Abnormal labs:   Abnormal Labs Reviewed   INFLUENZA A + B, PCR - Abnormal; Notable for the following components:       Result Value    Influenza A by PCR DETECTED (*)     All other components within normal limits   CBC WITH AUTO DIFFERENTIAL - Abnormal; Notable for the following components:    Hemoglobin 18.5 (*)     Hematocrit 54.5 (*)     Neutrophils % 73 (*)     Lymphocytes % 7 (*)     Monocytes % 19 (*)     All other components within normal limits   COMPREHENSIVE METABOLIC PANEL - Abnormal; Notable for the following components:    Glucose 160 (*)     ALT 73 (*)     AST 78 (*)     All other components within normal limits   TROPONIN - Abnormal; Notable for the following components:    Troponin, High Sensitivity 171 (*)     All other components within normal limits   TROPONIN - Abnormal; Notable for the following components:    Troponin, High Sensitivity 191 (*)     All other components within normal limits   BRAIN NATRIURETIC PEPTIDE - Abnormal; Notable for the following components:    NT Pro-BNP 1,765 (*)     All other components within normal limits   D-DIMER, QUANTITATIVE - Abnormal;

## 2025-01-15 NOTE — ED NOTES
Placed pt on 2L O2 NC since RT was unable to locate CPAP machine.   Pt is on continues SPO2/cardiac monitor; will monitor oxygen levels.   Lights off for pts comfort; call light within reach. Pt denies additional needs.

## 2025-01-15 NOTE — ED NOTES
ED TO INPATIENT SBAR HANDOFF    Patient Name: Kian Frias   Preferred Name: Kian  : 1960  64 y.o.   Family/Caregiver Present: no   Code Status Order: No Order  PO Status: NPO:No  Telemetry Order:   C-SSRS: Risk of Suicide: No Risk  Sitter no  no  Restraints:     Sepsis Risk Score      Situation  Chief Complaint   Patient presents with    Shortness of Breath     Patient reports he was sent by his PCP due to shortness of breath, wheezing and abnormal EKG.  Patient states he has been taking over the counter cough medicine for a long time.     Brief Description of Patient's Condition: Dry cough since , came today for SOB; noticed 's, cardizem given; pt denies CP/SOB/weakness/dizziness at this time; elevated trops; Heparin bolus and gtt started. Pt +FLU  Mental Status: oriented and alert  Arrived from:Home  Imaging:   XR CHEST (2 VW)   Final Result      CTA CHEST W CONTRAST    (Results Pending)     Abnormal labs:   Abnormal Labs Reviewed   INFLUENZA A + B, PCR - Abnormal; Notable for the following components:       Result Value    Influenza A by PCR DETECTED (*)     All other components within normal limits   CBC WITH AUTO DIFFERENTIAL - Abnormal; Notable for the following components:    Hemoglobin 18.5 (*)     Hematocrit 54.5 (*)     Neutrophils % 73 (*)     Lymphocytes % 7 (*)     Monocytes % 19 (*)     All other components within normal limits   COMPREHENSIVE METABOLIC PANEL - Abnormal; Notable for the following components:    Glucose 160 (*)     ALT 73 (*)     AST 78 (*)     All other components within normal limits   TROPONIN - Abnormal; Notable for the following components:    Troponin, High Sensitivity 171 (*)     All other components within normal limits   TROPONIN - Abnormal; Notable for the following components:    Troponin, High Sensitivity 191 (*)     All other components within normal limits   BRAIN NATRIURETIC PEPTIDE - Abnormal; Notable for the following components:    NT Pro-BNP  1,765 (*)     All other components within normal limits   D-DIMER, QUANTITATIVE - Abnormal; Notable for the following components:    D-Dimer, Quant 2.80 (*)     All other components within normal limits       Background  Allergies:   Allergies   Allergen Reactions    Zocor [Simvastatin] Other (See Comments)     Muscle pain     History:   Past Medical History:   Diagnosis Date    Heart murmur     \"as a baby assume I outgrew it \"    Hyperlipidemia     Kidney stone     \"one removed 20 yrs ago, passed a  stone 10 yrs ago- and have them in both kidneys now\"( 8/2017)    Sleep apnea     had sleep study done 5 + yrs ago- has cpap       Assessment  Vitals:    Level of Consciousness: Alert (0)   Vitals:    01/14/25 1902 01/14/25 1919 01/14/25 1921 01/14/25 2002   BP: 130/68 131/75 122/76    Pulse: 87 92 91 94   Resp: (!) 31 29 (!) 32 27   Temp:       SpO2: 95% 94% 96% 96%     Deterioration Index (DI):    Deterioration Index (DI) Interventions Performed:    O2 Flow Rate:    O2 Device: O2 Device: None (Room air)  Cardiac Rhythm:    Critical Lab Results: [unfilled]  Cultures: Cultures:None  NIH Score: NIH     Active LDA's:   Peripheral IV 01/14/25 Right Antecubital (Active)     Active Central Lines:                          Active Wounds:    Active Dooley's:    Active Feeding Tubes:      Administered Medications:   Medications   dilTIAZem injection 15 mg (15 mg IntraVENous Given 1/14/25 1656)   iopamidol (ISOVUE-370) 76 % injection 80 mL (80 mLs IntraVENous Given 1/14/25 1956)     Last documented pain medication administration: no  Pertinent or High Risk Medications/Drips: no   If Yes, please provide details: no  Blood Product Administration: no  If Yes, please provide details: no  Process Protocols/Bundles: N/A    Recommendation  Incomplete STAT orders: no  Overdue Medications: no  Patient Belongings:    Additional Comments: no  If any further questions, please call Sending RN at jillian      Admitting Unit Notification  Name of

## 2025-01-15 NOTE — CONSULTS
Cardiothoracic Surgery  IN-PATIENT SERVICE   [facility]    CONSULTATION / HISTORY AND PHYSICAL EXAMINATION            Date:   1/15/2025  Patient name:  Kian Frias  Date of admission:  1/14/2025  4:11 PM  MRN:   [id number]  Account:  [id number]  YOB: 1960  PCP:    Rajeev Merida MD  Room:   2029/2029-A  Code Status:    Full Code    Physician Requesting Consult: Asya Kuhn,*    Reason for Consult:  CAD    Chief Complaint:     Shortness of breath, cough    History of Present Illness:     Patient is a 63yo male with history of hyperlipidemia, diabetes, prior tobacco abuse and DAJUAN wit CPAP. He  was sent to the ED this morning by his PCP with SOB, cough, and an abnormal EKG. Upon arrival, patient was found to be in afib RVR/SVT and was started on a cardizem gtt plus heparin gtt. Troponin elevated, consistent with NSTEMI. Tested + for influenza. Cardiac cath completed this afternoon which showed severe multivessel CAD.     Past Medical History:     Past Medical History:   Diagnosis Date    Heart murmur     \"as a baby assume I outgrew it \"    Hyperlipidemia     Kidney stone     \"one removed 20 yrs ago, passed a  stone 10 yrs ago- and have them in both kidneys now\"( 8/2017)    Sleep apnea     had sleep study done 5 + yrs ago- has cpap        Past Surgical History:     Past Surgical History:   Procedure Laterality Date    COLONOSCOPY  2009    CYSTOSCOPY      stone manip - 20+ yrs ago    CYSTOSCOPY N/A 08/03/2017    with right retrograde pyelogram; right uteroscopy; right ureteral stent placement; laser lithotripsy        Medications Prior to Admission:     Prior to Admission medications    Medication Sig Start Date End Date Taking? Authorizing Provider   aspirin 81 MG tablet Take 1 tablet by mouth daily   Yes Provider, MD Kevin   Multiple Vitamins-Minerals (THERAPEUTIC MULTIVITAMIN-MINERALS) tablet Take 1 tablet by mouth    ALLERGIC/IMMUNOLOGIC:  negative for urticaria , itching  ENDOCRINE:  negative increase in drinking, increase in urination, hot or cold intolerance  MUSCULOSKELETAL:  negative joint pains, muscle aches, swelling of joints  NEUROLOGICAL:  negative for headaches, dizziness, lightheadedness, numbness, pain, tingling extremities  BEHAVIOR/PSYCH:  negative for depression, anxiety    Physical Exam:     /83   Pulse 78   Temp 98 °F (36.7 °C) (Oral)   Resp 21   Ht 1.778 m (5' 10\")   Wt 102.1 kg (225 lb)   SpO2 96%   BMI 32.28 kg/m²   Temp (24hrs), Av.2 °F (36.8 °C), Min:98 °F (36.7 °C), Max:98.3 °F (36.8 °C)      No results for input(s): \"POCGLU\" in the last 72 hours.    Intake/Output Summary (Last 24 hours) at 1/15/2025 1449  Last data filed at 1/15/2025 0942  Gross per 24 hour   Intake 10 ml   Output --   Net 10 ml       General Appearance:  alert, well appearing, and in no acute distress  Mental status: oriented to person, place, and time with normal affect  Head:  normocephalic, atraumatic.  Eye: no icterus, redness, pupils equal and reactive, extraocular eye movements intact, conjunctiva clear  Ear: normal external ear, no discharge, hearing intact  Nose:  no drainage noted  Mouth: mucous membranes moist  Neck: supple, no carotid bruits  Lungs: Bilateral equal air entry, clear to ausculation, no wheezing, rales or rhonchi, normal effort  Cardiovascular: normal rate, regular rhythm, no murmur, gallop, rub.  Abdomen: Soft, nontender, nondistended, normal bowel sounds  Neurologic: There are no new focal motor or sensory deficits, normal muscle tone and bulk, no abnormal sensation, normal speech  Skin: No gross lesions, rashes, bruising or bleeding on exposed skin area  Extremities:  peripheral pulses palpable, no pedal edema or calf pain with palpation  Psych: normal affect    Investigations:      Laboratory Testing:  Recent Results (from the past 24 hour(s))   EKG 12 Lead    Collection Time: 25

## 2025-01-15 NOTE — H&P
contacts at work.  Patient reports dry cough with intermittent phlegm.  Also described soreness from coughing.  Denied any chest pain.  Patient denied any fever or chills.  Denied any nausea, vomiting, abdominal pain, denying any urinary complaints, no constipation or diarrhea.  Patient denied any BRBPR, melena.  Vitals on arrival-/99, , RR 18, temp 98.2, saturating 98% on room air.  Labs significant for random glucose 160, NT proBNP 1765, troponin 171, repeat 191, 229, ALT 73, AST 78, hemoglobin 18.5.  D-dimer 2.80, influenza A detected.  CTA chest-no PE, bilateral hilar lymphadenopathy.  Patient received Cardizem 15 mg IV x 1, started on heparin drip in ER.    Review of Systems: Need 10 Elements   10 point review of systems conducted and pertinent positives and negatives as per HPI.    Objective:   No intake or output data in the 24 hours ending 01/14/25 2142   Vitals:   Vitals:    01/14/25 2045 01/14/25 2100 01/14/25 2104 01/14/25 2115   BP: 123/60 120/69 120/69 (!) 105/59   Pulse: 92 93 95 94   Resp: 28 29 25 28   Temp:       SpO2: 93% 94% 94% 92%   Weight:   102.1 kg (225 lb)    Height:   1.778 m (5' 10\")        Medications Prior to Admission   Reviewed medications with patient    Patient reports he takes baby aspirin, co-Q10, milk thistle    Physical Exam: Need 8 Elements   Physical Exam     GEN  -Awake, alert, NAD.   EYES   -PERRL.   HENT  -MM are moist.   RESP  -LS CTA equal bilat, no wheezes, rales or rhonchi. Symmetric chest movement. No respiratory distress noted.  C/V  -S1/S2 auscultated. RRR without appreciable M/R/G. Peripheral pulses equal bilaterally and palpable. No peripheral edema. No reproducible chest wall tenderness.   GI  -Abdomen is soft, non-distended, no significant tenderness. No masses or guarding. + BS in all quadrants. Rectal exam deferred.     -No CVA tenderness. Dooley catheter is not present.  MS  -B/L extremities - No gross joint deformities. No swelling, intact  silhouette are normal. The lungs are clear. The osseous structures are intact. IMPRESSION: 1.  No evidence for acute disease This dictation was created with voice recognition software.  While attempts have  been made to review the dictation as it is transcribed, on occasion the spoken word can be misinterpreted by the technology leading to omissions or inappropriate words, phrases or sentences.  Dictated and Electronically Signed By: Vianca Hayes MD 1/14/2025 16:45          Personally reviewed Lab Studies, Imaging, and discussed case with ED physician.    Electronically signed by Zainab Handley MD on 1/14/2025 at 9:42 PM    Comment: Please note this report has been produced using speech recognition software and may contain errors related to that system including errors in grammar, punctuation, and spelling, as well as words and phrases that may be inappropriate. If there are any questions or concerns please feel free to contact the dictating provider for clarification.

## 2025-01-16 ENCOUNTER — APPOINTMENT (OUTPATIENT)
Dept: ULTRASOUND IMAGING | Age: 65
End: 2025-01-16
Payer: MEDICARE

## 2025-01-16 PROBLEM — J10.1 INFLUENZA A: Status: ACTIVE | Noted: 2025-01-16

## 2025-01-16 PROBLEM — I21.4 NSTEMI (NON-ST ELEVATED MYOCARDIAL INFARCTION) (HCC): Status: ACTIVE | Noted: 2025-01-16

## 2025-01-16 LAB
ALBUMIN SERPL-MCNC: 3.9 G/DL (ref 3.4–5)
ALBUMIN/GLOB SERPL: 1.3 {RATIO} (ref 1.1–2.2)
ALP SERPL-CCNC: 57 U/L (ref 40–129)
ALT SERPL-CCNC: 55 U/L (ref 10–40)
ANION GAP SERPL CALCULATED.3IONS-SCNC: 10 MMOL/L (ref 9–17)
ANTI-XA UNFRAC HEPARIN: 0.13 IU/L
AST SERPL-CCNC: 61 U/L (ref 15–37)
BASOPHILS # BLD: 0.02 K/UL
BASOPHILS NFR BLD: 0 % (ref 0–1)
BILIRUB SERPL-MCNC: 0.5 MG/DL (ref 0–1)
BUN SERPL-MCNC: 19 MG/DL (ref 7–20)
CALCIUM SERPL-MCNC: 9.1 MG/DL (ref 8.3–10.6)
CHLORIDE SERPL-SCNC: 104 MMOL/L (ref 99–110)
CO2 SERPL-SCNC: 24 MMOL/L (ref 21–32)
CREAT SERPL-MCNC: 1 MG/DL (ref 0.8–1.3)
EOSINOPHIL # BLD: 0.05 K/UL
EOSINOPHILS RELATIVE PERCENT: 1 % (ref 0–3)
ERYTHROCYTE [DISTWIDTH] IN BLOOD BY AUTOMATED COUNT: 13.2 % (ref 11.7–14.9)
GFR, ESTIMATED: 75 ML/MIN/1.73M2
GLUCOSE SERPL-MCNC: 100 MG/DL (ref 74–99)
HCT VFR BLD AUTO: 52.8 % (ref 42–52)
HGB BLD-MCNC: 17.7 G/DL (ref 13.5–18)
IMM GRANULOCYTES # BLD AUTO: 0.02 K/UL
IMM GRANULOCYTES NFR BLD: 0 %
LYMPHOCYTES NFR BLD: 0.96 K/UL
LYMPHOCYTES RELATIVE PERCENT: 17 % (ref 24–44)
MAGNESIUM SERPL-MCNC: 2.1 MG/DL (ref 1.8–2.4)
MCH RBC QN AUTO: 31.1 PG (ref 27–31)
MCHC RBC AUTO-ENTMCNC: 33.5 G/DL (ref 32–36)
MCV RBC AUTO: 92.6 FL (ref 78–100)
MONOCYTES NFR BLD: 1.02 K/UL
MONOCYTES NFR BLD: 19 % (ref 0–4)
NEUTROPHILS NFR BLD: 62 % (ref 36–66)
NEUTS SEG NFR BLD: 3.45 K/UL
PLATELET # BLD AUTO: 158 K/UL (ref 140–440)
PMV BLD AUTO: 11 FL (ref 7.5–11.1)
POTASSIUM SERPL-SCNC: 4.4 MMOL/L (ref 3.5–5.1)
PROT SERPL-MCNC: 6.9 G/DL (ref 6.4–8.2)
RBC # BLD AUTO: 5.7 M/UL (ref 4.6–6.2)
SODIUM SERPL-SCNC: 138 MMOL/L (ref 136–145)
WBC OTHER # BLD: 5.5 K/UL (ref 4–10.5)

## 2025-01-16 PROCEDURE — APPNB15 APP NON BILLABLE TIME 0-15 MINS

## 2025-01-16 PROCEDURE — 80053 COMPREHEN METABOLIC PANEL: CPT

## 2025-01-16 PROCEDURE — 6360000002 HC RX W HCPCS: Performed by: INTERNAL MEDICINE

## 2025-01-16 PROCEDURE — 83735 ASSAY OF MAGNESIUM: CPT

## 2025-01-16 PROCEDURE — 99223 1ST HOSP IP/OBS HIGH 75: CPT | Performed by: INTERNAL MEDICINE

## 2025-01-16 PROCEDURE — 1200000000 HC SEMI PRIVATE

## 2025-01-16 PROCEDURE — 2500000003 HC RX 250 WO HCPCS: Performed by: INTERNAL MEDICINE

## 2025-01-16 PROCEDURE — 85025 COMPLETE CBC W/AUTO DIFF WBC: CPT

## 2025-01-16 PROCEDURE — 93880 EXTRACRANIAL BILAT STUDY: CPT

## 2025-01-16 PROCEDURE — 6360000002 HC RX W HCPCS

## 2025-01-16 PROCEDURE — 85520 HEPARIN ASSAY: CPT

## 2025-01-16 PROCEDURE — APPSS60 APP SPLIT SHARED TIME 46-60 MINUTES: Performed by: NURSE PRACTITIONER

## 2025-01-16 PROCEDURE — 6370000000 HC RX 637 (ALT 250 FOR IP): Performed by: INTERNAL MEDICINE

## 2025-01-16 PROCEDURE — 6370000000 HC RX 637 (ALT 250 FOR IP): Performed by: STUDENT IN AN ORGANIZED HEALTH CARE EDUCATION/TRAINING PROGRAM

## 2025-01-16 PROCEDURE — 94761 N-INVAS EAR/PLS OXIMETRY MLT: CPT

## 2025-01-16 PROCEDURE — 6370000000 HC RX 637 (ALT 250 FOR IP)

## 2025-01-16 PROCEDURE — 36415 COLL VENOUS BLD VENIPUNCTURE: CPT

## 2025-01-16 PROCEDURE — 93970 EXTREMITY STUDY: CPT

## 2025-01-16 PROCEDURE — 99232 SBSQ HOSP IP/OBS MODERATE 35: CPT | Performed by: INTERNAL MEDICINE

## 2025-01-16 RX ORDER — LATANOPROST 50 UG/ML
1 SOLUTION/ DROPS OPHTHALMIC NIGHTLY
Status: DISCONTINUED | OUTPATIENT
Start: 2025-01-16 | End: 2025-01-17 | Stop reason: HOSPADM

## 2025-01-16 RX ORDER — CARBOXYMETHYLCELLULOSE SODIUM 10 MG/ML
1 GEL OPHTHALMIC 3 TIMES DAILY
Status: DISCONTINUED | OUTPATIENT
Start: 2025-01-16 | End: 2025-01-16

## 2025-01-16 RX ORDER — LATANOPROST 50 UG/ML
1 SOLUTION/ DROPS OPHTHALMIC NIGHTLY
COMMUNITY

## 2025-01-16 RX ORDER — ENOXAPARIN SODIUM 100 MG/ML
1 INJECTION SUBCUTANEOUS 2 TIMES DAILY
Status: COMPLETED | OUTPATIENT
Start: 2025-01-16 | End: 2025-01-16

## 2025-01-16 RX ORDER — PRAVASTATIN SODIUM 20 MG
10 TABLET ORAL NIGHTLY
Status: DISCONTINUED | OUTPATIENT
Start: 2025-01-16 | End: 2025-01-17 | Stop reason: HOSPADM

## 2025-01-16 RX ORDER — ENOXAPARIN SODIUM 100 MG/ML
1 INJECTION SUBCUTANEOUS 2 TIMES DAILY
Status: COMPLETED | OUTPATIENT
Start: 2025-01-17 | End: 2025-01-17

## 2025-01-16 RX ORDER — CLOPIDOGREL BISULFATE 75 MG/1
75 TABLET ORAL DAILY
Status: DISCONTINUED | OUTPATIENT
Start: 2025-01-16 | End: 2025-01-17 | Stop reason: HOSPADM

## 2025-01-16 RX ORDER — METOPROLOL SUCCINATE 25 MG/1
25 TABLET, EXTENDED RELEASE ORAL DAILY
Status: DISCONTINUED | OUTPATIENT
Start: 2025-01-16 | End: 2025-01-17 | Stop reason: HOSPADM

## 2025-01-16 RX ADMIN — SODIUM CHLORIDE, PRESERVATIVE FREE 10 ML: 5 INJECTION INTRAVENOUS at 20:25

## 2025-01-16 RX ADMIN — METOPROLOL SUCCINATE 25 MG: 25 TABLET, EXTENDED RELEASE ORAL at 20:24

## 2025-01-16 RX ADMIN — ENOXAPARIN SODIUM 100 MG: 100 INJECTION SUBCUTANEOUS at 12:50

## 2025-01-16 RX ADMIN — SODIUM CHLORIDE, PRESERVATIVE FREE 10 ML: 5 INJECTION INTRAVENOUS at 08:37

## 2025-01-16 RX ADMIN — OSELTAMIVIR PHOSPHATE 75 MG: 75 CAPSULE ORAL at 20:24

## 2025-01-16 RX ADMIN — ASPIRIN 81 MG CHEWABLE TABLET 81 MG: 81 TABLET CHEWABLE at 08:37

## 2025-01-16 RX ADMIN — HEPARIN SODIUM 2000 UNITS: 1000 INJECTION INTRAVENOUS; SUBCUTANEOUS at 05:18

## 2025-01-16 RX ADMIN — LATANOPROST 1 DROP: 50 SOLUTION/ DROPS OPHTHALMIC at 20:25

## 2025-01-16 RX ADMIN — HYDRALAZINE HYDROCHLORIDE 10 MG: 20 INJECTION INTRAMUSCULAR; INTRAVENOUS at 18:15

## 2025-01-16 RX ADMIN — OSELTAMIVIR PHOSPHATE 75 MG: 75 CAPSULE ORAL at 08:37

## 2025-01-16 RX ADMIN — METOPROLOL TARTRATE 25 MG: 25 TABLET, FILM COATED ORAL at 08:37

## 2025-01-16 RX ADMIN — CLOPIDOGREL BISULFATE 75 MG: 75 TABLET ORAL at 12:50

## 2025-01-16 RX ADMIN — HEPARIN SODIUM 11 UNITS/KG/HR: 10000 INJECTION, SOLUTION INTRAVENOUS at 05:19

## 2025-01-16 RX ADMIN — HEPARIN SODIUM 9 UNITS/KG/HR: 10000 INJECTION, SOLUTION INTRAVENOUS at 01:55

## 2025-01-16 RX ADMIN — PRAVASTATIN SODIUM 10 MG: 20 TABLET ORAL at 20:24

## 2025-01-16 NOTE — PROGRESS NOTES
Progress Note      Name:  Kian Frias /Age/Sex: 1960  (64 y.o. male)   MRN & CSN:  [id number] & [id number] Encounter Date/Time: 2025 9:42 PM EST   Location:  -A PCP: Rajeev Merida MD       Hospital Day: 3    Assessment and Plan:     SVT  likely AVNRT - HR controlled now  --160 on arrival  -EKG consistent with SVT  -Patient received a dose of Cardizem 15 mg x 1-converted to NSR  -TSH 0.98  -Heart rate remains 80-90  -CTA chest-no PE  -Discussed with Cardiology - Echo showed EF 65 -70 %.  C on 1/15 reviewed - Has triple vessel disease. Plan for CABG  Discussed with CTS - Recommended to consult ID for influenza  Discussed with ID - Continue Tamiflu for now Further recs to follow on when patient can get CABG    Diabetes mellitus type 2-new diagnosis  -Ordered HbA1c given random blood glucose  -A1c-6.5  -Continue insulin sliding scale with hypoglycemia protocol  Will need oral antidiabetic on discharge     Trending of troponin - Plan for [facility]  -[phone]  -EKG with no acute ST-T wave changes  -Started on heparin drip in ER  -Trend troponin, repeat EKG  -Keep n.p.o. after midnight  -2D echo ordered  -Continue aspirin (patient takes aspirin daily)     #.  Influenza A  -CTA chest-no pneumonia  -Continue Tamiflu    #.  Abnormal LFTs  -Monitor with repeat LFTs  -Patient denying any right upper quadrant pain  -Further imaging with ultrasound/CT abdomen/pelvis if worsening renal function.        #.  Hyperlipidemia  -Patient reported intolerance to statins-had myalgia  -Takes herbal medication-milk thistle, co-Q10  -Check lipid profile    #.  DAJUAN on CPAP  -Does not know his settings  -NIPPV ordered    #.  Remote history of smoking    #.  Obesity with BMI 32.28    Disposition:   Current Living situation: Home    Diet Keep n.p.o. after midnight   DVT Prophylaxis Heparin infusion   Code Status FULL   Surrogate Decision Maker/ POA      History from:   EMR, patient.    History of  1.  No evidence for acute disease This dictation was created with voice recognition software.  While attempts have  been made to review the dictation as it is transcribed, on occasion the spoken word can be misinterpreted by the technology leading to omissions or inappropriate words, phrases or sentences.  Dictated and Electronically Signed By: Vianca Hayes MD 1/14/2025 16:45          Personally reviewed Lab Studies, Imaging, and discussed case with ED physician.    Electronically signed by Asya Guillen MD on 1/16/2025 at 10:44 AM    Comment: Please note this report has been produced using speech recognition software and may contain errors related to that system including errors in grammar, punctuation, and spelling, as well as words and phrases that may be inappropriate. If there are any questions or concerns please feel free to contact the dictating provider for clarification.

## 2025-01-16 NOTE — PROGRESS NOTES
[place] [address]  Phone: [phone]    Fax [phone]                  Samra Alamo MD, [facility]       Oscar Del Toro MD, [facility]  Katey Lange MD, [facility]    MD Yuriy Lincoln MD Tariq Rizvi, MD Bilal Alam, MD Dr. Waseem Sajjad MD Melissa Kellis, APRRHETT Sanchez, APRRHETT Romero, APRRHETT Santos, APRN  TIAN LomaxC    CARDIOLOGY  NOTE      Name:  Kian Frias /Age/Sex: 1960  (64 y.o. male)   MRN & CSN:  [id number] & [id number] Admission Date/Time: 2025  4:11 PM   Location:  -A PCP: Rajeev Merida MD       Hospital Day: 3    - Cardiology consult is for: SVT      ASSESSMENT/ PLAN:  Supraventricular tachycardia, likely AVNRT  NSTEMI   severe multivessel coronary artery disease  -No arrhythmia overnight  -Goal potassium 4.0-4.5, magnesium 2.0-2.2. Replete per protocol   -TSH within normal limits  -EKG is nonischemic.  Troponin initially 171 and noris to 229  -No prior cardiac history, however does have family history of CAD  -Echocardiogram showing EF 65-70%, no wall motion abnormalities  -Left heart catheterization revealing severe multivessel coronary artery disease.  -CT surgery consulted.  Likely outpatient CABG due to flu  -Switch to Toprol-XL 25 mg daily  -Can stop heparin.  Give one-time dose of Lovenox for 48-hour coverage  -Start Plavix.  Continue aspirin  -Start Pravachol 10 mg nightly.  Monitor LFTs closely.  Was intolerant to Zocor in the past  Influenza  -Likely provoked SVT  New onset diabetes mellitus  -Hemoglobin A1c 6.5   History of tobacco abuse: Has not smoked for over 15 years          Subjective:  Kian is a 64 y.o.year old     Patient states that he has discomfort in head, neck, chest and back when he coughs however no chest pain at rest or with exertion.    Patient still having some shortness of breath

## 2025-01-16 NOTE — CARE COORDINATION
01/16/25 1308   Service Assessment   Patient Orientation Alert and Oriented   Cognition Alert   History Provided By Patient   Primary Caregiver Self   Support Systems Family Members   PCP Verified by CM Yes   Last Visit to PCP Within last 3 months  (3 days ago)   Prior Functional Level Independent in ADLs/IADLs   Current Functional Level Assistance with the following:;Bathing;Toileting;Mobility  (hospital policy)   Can patient return to prior living arrangement Yes   Ability to make needs known: Good   Family able to assist with home care needs: Yes   Financial Resources Medicare   Community Resources None   Social/Functional History   Lives With Alone   Type of Home House   Home Layout One level;Laundry in basement   Home Access Stairs to enter without rails   Entrance Stairs - Number of Steps 1   Bathroom Shower/Tub Walk-in shower   Bathroom Toilet Handicap height   Bathroom Equipment Shower chair;Toilet raiser   Bathroom Accessibility Accessible   Home Equipment Rollator;Walker - Rolling;Lift chair   Receives Help From Family   Prior Level of Assist for ADLs Independent   Prior Level of Assist for Homemaking Independent   Homemaking Responsibilities Yes   Ambulation Assistance Independent   Prior Level of Assist for Transfers Independent   Active  Yes   Occupation Full time employment   Type of Occupation leave of absense due to health issues.   Discharge Planning   Type of Residence House   Living Arrangements Alone   Current Services Prior To Admission C-pap   Potential Assistance Needed Home Care   Type of Home Care Services None   Patient expects to be discharged to: House   One/Two Story Residence One story   Services At/After Discharge   Confirm Follow Up Transport Family     CM reviewed chart and discussed. Pt has insurance and a PCP whom pt saw a few days ago. Pt is from home alone and has good family support. Pt is employed full-time, currently not working due to health issues. Pt is independent  PTA and is an active . Pt has a Cpap, walkers, shower stool, toilet risers, and left chairs. Plan is home with family support and possibly home health care. CM following for needs.

## 2025-01-16 NOTE — PROGRESS NOTES
Cardiothoracic Surgery  IN-PATIENT SERVICE   [facility]    Daily Progress Note             Date:   1/16/2025  Patient name:  Kian Frias  Date of admission:  1/14/2025  4:11 PM  MRN:   [id number]  Account:  [id number]  YOB: 1960  PCP:    Rajeev Merida MD  Room:   2029/2029-A  Code Status:    Full Code    Physician Requesting Consult: Asya Kuhn,*    Reason for Consult:  CAD    Chief Complaint:     Shortness of breath, cough    History of Present Illness:     Patient is a 63yo male with history of hyperlipidemia, diabetes, prior tobacco abuse and DAJUAN wit CPAP. He  was sent to the ED this morning by his PCP with SOB, cough, and an abnormal EKG. Upon arrival, patient was found to be in afib RVR/SVT and was started on a cardizem gtt plus heparin gtt. Troponin elevated, consistent with NSTEMI. Tested + for influenza. Cardiac cath completed this afternoon which showed severe multivessel CAD.     Past Medical History:     Past Medical History:   Diagnosis Date    Coronary artery disease 01/15/2025    Heart murmur     \"as a baby assume I outgrew it \"    Hyperlipidemia     Kidney stone     \"one removed 20 yrs ago, passed a  stone 10 yrs ago- and have them in both kidneys now\"( 8/2017)    NSTEMI (non-ST elevated myocardial infarction) ([facility]) 01/15/2025    Sleep apnea     had sleep study done 5 + yrs ago- has cpap    SVT (supraventricular tachycardia) ([facility]) 01/15/2025        Past Surgical History:     Past Surgical History:   Procedure Laterality Date    CARDIAC PROCEDURE N/A 1/15/2025    Left heart cath / coronary angiography performed by Florencio Dubois MD at [facility] CARDIAC CATH LAB    COLONOSCOPY  2009    CYSTOSCOPY      stone manip - 20+ yrs ago    CYSTOSCOPY N/A 08/03/2017    with right retrograde pyelogram; right uteroscopy; right ureteral stent placement; laser lithotripsy        Medications Prior to Admission:     Prior to Admission  abnormalities are seen.           This dictation was created with voice recognition software.  While attempts have   been made to review the dictation as it is transcribed, on occasion the spoken   word can be misinterpreted by the technology leading to omissions or   inappropriate words, phrases or sentences.           Dictated and Electronically Signed By:   Mehul Christianson MD   1/16/2025 4:10     Bedside Spirometry:      Hx of:  Afib: none  PCI: no  Chest radiation:       S[employer] (STS) Short-Term Operative Risk Calculator Evaluation    Kian Frias's information was entered into the STS Operative Risk calculator with the results as shown below.    [email or web address]      Procedure Type: Isolated CABG   Perioperative Outcome Estimate %   Operative Mortality 0.75%   Morbidity & Mortality 4.37%   Stroke 0.494%   Renal Failure 0.48%   Reoperation 2.09%   Prolonged Ventilation 2.28%   Deep Sternal Wound Infection 0.148%   Long Hospital Stay (>14 days) 1.59%   Short Hospital Stay (<6 days)* 66.8%         Clinical Summary       Planned Surgery: Isolated CABG, Elective, First cardiovascular surgery   Demographics: 64 year old, male, 102kg, 177cm, BMI: 32.6 kg/m²   Lab Values: Creatinine: 1.1 mg/dL, Hematocrit: 49.6%, WBC Count: 5.9 10³/?L, Platelet Count: 394604 cells/?L   Substance Abuse: Former smoker   Risk Factors / Comorbidities: Diabetes Mellitus   Coronary Artery Disease: 3 vessels diseased, Proximal LAD Stenosis >= 70%, Non-ST Elevation MI, MI: 1 to 7 Days   Valve Disease: Trivial/Trace MR, Trivial/Trace TR   Arrhythmia: Recent V. Tach / V. Fib         Assessment :      CAD  NSTEMI  SVT  Influenza      Is the patient on a blood thinner? Heparin gtt  Most recent dental exam: unknown  Significant allergies: none  Beta-blocker started? no  ACEi/ARBs held? NA  History of afib?no If yes -> size of LA, duration, h/o ablations?    Plan:       Recommendation:  I believe

## 2025-01-16 NOTE — CONSULTS
Infectious Disease Consult Note  2025   Patient Name: Kian Frias : 1960   Impression  Influenza A:  Acute Hypoxic Respiratory Failure:   No reported allergies to ABX. CrCl 86. Afebrile. No leukocytosis. COVID-19 negative.   -Influenza A positive  -PCXR: no evidence for acute disease.  -CTA pulmonary w contrast: No CT evidence of pulmonary embolism seen in the lobar or segmental pulmonary arteries bilaterally.   Bilateral hilar lymphadenopathy measuring up to 2.4 x 2 cm. No mediastinal or   axillary lymphadenopathy seen. No pneumonia seen.   Bilateral renal stones. Please see above for more details.   NSTEMI/ SVT:  Dr. Michaels/ [facility] onboard, Heparin gtt  1/15-S/p LHC: revealed multivessel CAD with recs for CABG.   -complete TTE: EF 65-70%  Dr. Cruz planning CABG pending recovery for influenza  New Diagnosis: DMII:  Severe DAJUAN on CPAP:  Bilateral Nephrolithiasis:   Multi-morbidity: per PMHx: HLD, DAJUAN on CPAP and nephrolithiasis s/p cystoscopy  Plan:  Continue po Tamiflu 75 mg bid x 5 days (end date planned 2025)  ID recommends to wait 14 days from onset of influenza symptoms (after 2025) prior to any surgical intervention as underlying influenza places patient at higher risk for pneumonia, however, if patient becomes hemodynamically unstable and/or needs urgent surgery, would recommend surgery asap.  FRANKLYN Lopez PA-C from [facility]  Thank you for allowing me to consult in the care of this patient.  ------------------------  REASON FOR CONSULT: Infective syndrome \"+FLU, NSTEMI\"  Requested by: UZMA Huggins PA-C  HPI:Patient is a 64 y.o.  male with a history of HLD, DAJUAN on CPAP and nephrolithiasis s/p cystoscopy who was admitted 2025 for further evaluation and management as referred from PCP due to tachycardia. Reported has had a dry cough and body aches x 2 days with exposure to sick contacts at work. Presented afebrile. No leukocytosis. COVID-19 negative. He was  contibutory.   SOCIAL HISTORY  Social History     Tobacco Use    Smoking status: Former     Current packs/day: 0.00     Average packs/day: 1 pack/day for 30.0 years (30.0 ttl pk-yrs)     Types: Cigarettes     Start date: 1977     Quit date: 2007     Years since quittin.0    Smokeless tobacco: Never   Substance Use Topics    Alcohol use: Yes     Comment: average on weekends only      Born:[place], [place]  Lives:[place], [place], alone  Occupation:Works full time with [employer]  No recent travel of significance.  No recent unusual exposures.  NO pets    ?  ALLERGIES  Allergies   Allergen Reactions    Zocor [Simvastatin] Other (See Comments)     Muscle pain      MEDICATIONS  Reviewed and are per the chart/EMR.   ?  Antibiotics:   Present:  Tamiflu 1/15-  Past:   ?  -------------------------------------------------------------------------------------------------------------------    Vital Signs:  Vitals:    25 0516   BP: (!) 145/85   Pulse: 66   Resp: 24   Temp: 98.1 °F (36.7 °C)   SpO2: 96%         Exam:    VS: noted; wt 213 lb (97 kg), Height 5'10\"  Gen: alert and oriented X3, no distress  Skin: no stigmata of endocarditis  Wounds: C/D/I  HEMT: AT/NC Oropharynx pink, moist, and without lesions or exudates; dentition in good state of repair  Eyes: PERRLA, EOMI, conjunctiva pink, sclera anicteric.   Neck: Supple. Trachea midline. No LAD.  Chest: no distress and CTA. Good air movement. On CPAP with room air. Occasional dry cough.   Heart: NSR and no MRG.   Abd: soft, non-distended, no tenderness, no hepatomegaly. Normoactive bowel sounds.  Ext: no clubbing, cyanosis, or edema  Neuro: Mental status intact. CN 2-12 intact and no focal sensory or motor deficits    ?Diagnostic Studies: reviewed  2025 XR Chest (2 VW):  IMPRESSION:   1.  No evidence for acute disease    2025 CTA Pulmonary W Contrast:  IMPRESSION:  No CT evidence of pulmonary embolism seen in the lobar or segmental pulmonary

## 2025-01-17 ENCOUNTER — TELEPHONE (OUTPATIENT)
Dept: CARDIOTHORACIC SURGERY | Age: 65
End: 2025-01-17

## 2025-01-17 VITALS
RESPIRATION RATE: 20 BRPM | TEMPERATURE: 97.8 F | OXYGEN SATURATION: 97 % | HEART RATE: 76 BPM | DIASTOLIC BLOOD PRESSURE: 85 MMHG | BODY MASS INDEX: 30.61 KG/M2 | WEIGHT: 213.85 LBS | HEIGHT: 70 IN | SYSTOLIC BLOOD PRESSURE: 145 MMHG

## 2025-01-17 DIAGNOSIS — I21.4 NSTEMI (NON-ST ELEVATED MYOCARDIAL INFARCTION) (HCC): Primary | ICD-10-CM

## 2025-01-17 LAB
ALBUMIN SERPL-MCNC: 3.7 G/DL (ref 3.4–5)
ALBUMIN/GLOB SERPL: 1.3 {RATIO} (ref 1.1–2.2)
ALP SERPL-CCNC: 58 U/L (ref 40–129)
ALT SERPL-CCNC: 46 U/L (ref 10–40)
ANION GAP SERPL CALCULATED.3IONS-SCNC: 11 MMOL/L (ref 9–17)
AST SERPL-CCNC: 45 U/L (ref 15–37)
BASOPHILS # BLD: 0.02 K/UL
BASOPHILS NFR BLD: 0 % (ref 0–1)
BILIRUB SERPL-MCNC: 0.5 MG/DL (ref 0–1)
BUN SERPL-MCNC: 15 MG/DL (ref 7–20)
CALCIUM SERPL-MCNC: 9 MG/DL (ref 8.3–10.6)
CHLORIDE SERPL-SCNC: 103 MMOL/L (ref 99–110)
CO2 SERPL-SCNC: 22 MMOL/L (ref 21–32)
CREAT SERPL-MCNC: 0.9 MG/DL (ref 0.8–1.3)
EOSINOPHIL # BLD: 0.1 K/UL
EOSINOPHILS RELATIVE PERCENT: 2 % (ref 0–3)
ERYTHROCYTE [DISTWIDTH] IN BLOOD BY AUTOMATED COUNT: 13.1 % (ref 11.7–14.9)
GFR, ESTIMATED: 83 ML/MIN/1.73M2
GLUCOSE SERPL-MCNC: 105 MG/DL (ref 74–99)
HCT VFR BLD AUTO: 50.3 % (ref 42–52)
HGB BLD-MCNC: 17.3 G/DL (ref 13.5–18)
IMM GRANULOCYTES # BLD AUTO: 0.02 K/UL
IMM GRANULOCYTES NFR BLD: 0 %
LYMPHOCYTES NFR BLD: 0.98 K/UL
LYMPHOCYTES RELATIVE PERCENT: 17 % (ref 24–44)
MAGNESIUM SERPL-MCNC: 1.9 MG/DL (ref 1.8–2.4)
MCH RBC QN AUTO: 30.9 PG (ref 27–31)
MCHC RBC AUTO-ENTMCNC: 34.4 G/DL (ref 32–36)
MCV RBC AUTO: 90 FL (ref 78–100)
MONOCYTES NFR BLD: 0.77 K/UL
MONOCYTES NFR BLD: 13 % (ref 0–4)
NEUTROPHILS NFR BLD: 67 % (ref 36–66)
NEUTS SEG NFR BLD: 3.84 K/UL
PLATELET # BLD AUTO: 154 K/UL (ref 140–440)
PMV BLD AUTO: 10.5 FL (ref 7.5–11.1)
POTASSIUM SERPL-SCNC: 3.8 MMOL/L (ref 3.5–5.1)
PROT SERPL-MCNC: 6.6 G/DL (ref 6.4–8.2)
RBC # BLD AUTO: 5.59 M/UL (ref 4.6–6.2)
SODIUM SERPL-SCNC: 135 MMOL/L (ref 136–145)
WBC OTHER # BLD: 5.7 K/UL (ref 4–10.5)

## 2025-01-17 PROCEDURE — APPNB15 APP NON BILLABLE TIME 0-15 MINS

## 2025-01-17 PROCEDURE — 99232 SBSQ HOSP IP/OBS MODERATE 35: CPT | Performed by: INTERNAL MEDICINE

## 2025-01-17 PROCEDURE — 6370000000 HC RX 637 (ALT 250 FOR IP): Performed by: INTERNAL MEDICINE

## 2025-01-17 PROCEDURE — 6370000000 HC RX 637 (ALT 250 FOR IP): Performed by: STUDENT IN AN ORGANIZED HEALTH CARE EDUCATION/TRAINING PROGRAM

## 2025-01-17 PROCEDURE — 80053 COMPREHEN METABOLIC PANEL: CPT

## 2025-01-17 PROCEDURE — 6370000000 HC RX 637 (ALT 250 FOR IP)

## 2025-01-17 PROCEDURE — 83735 ASSAY OF MAGNESIUM: CPT

## 2025-01-17 PROCEDURE — 2500000003 HC RX 250 WO HCPCS: Performed by: INTERNAL MEDICINE

## 2025-01-17 PROCEDURE — 85025 COMPLETE CBC W/AUTO DIFF WBC: CPT

## 2025-01-17 PROCEDURE — 36415 COLL VENOUS BLD VENIPUNCTURE: CPT

## 2025-01-17 PROCEDURE — 94761 N-INVAS EAR/PLS OXIMETRY MLT: CPT

## 2025-01-17 PROCEDURE — 6360000002 HC RX W HCPCS

## 2025-01-17 RX ORDER — BENZONATATE 100 MG/1
100 CAPSULE ORAL 3 TIMES DAILY PRN
Qty: 15 CAPSULE | Refills: 0 | Status: SHIPPED | OUTPATIENT
Start: 2025-01-17 | End: 2025-01-24

## 2025-01-17 RX ORDER — OSELTAMIVIR PHOSPHATE 75 MG/1
75 CAPSULE ORAL 2 TIMES DAILY
Qty: 4 CAPSULE | Refills: 0 | Status: SHIPPED | OUTPATIENT
Start: 2025-01-17 | End: 2025-01-19

## 2025-01-17 RX ORDER — METOPROLOL SUCCINATE 25 MG/1
25 TABLET, EXTENDED RELEASE ORAL DAILY
Qty: 30 TABLET | Refills: 3 | Status: SHIPPED | OUTPATIENT
Start: 2025-01-18

## 2025-01-17 RX ORDER — CLOPIDOGREL BISULFATE 75 MG/1
75 TABLET ORAL DAILY
Qty: 30 TABLET | Refills: 3 | Status: SHIPPED | OUTPATIENT
Start: 2025-01-18

## 2025-01-17 RX ORDER — PRAVASTATIN SODIUM 10 MG
10 TABLET ORAL NIGHTLY
Qty: 30 TABLET | Refills: 3 | Status: SHIPPED | OUTPATIENT
Start: 2025-01-17

## 2025-01-17 RX ADMIN — ASPIRIN 81 MG CHEWABLE TABLET 81 MG: 81 TABLET CHEWABLE at 08:20

## 2025-01-17 RX ADMIN — METFORMIN HYDROCHLORIDE 500 MG: 500 TABLET ORAL at 14:43

## 2025-01-17 RX ADMIN — CLOPIDOGREL BISULFATE 75 MG: 75 TABLET ORAL at 08:20

## 2025-01-17 RX ADMIN — ENOXAPARIN SODIUM 100 MG: 100 INJECTION SUBCUTANEOUS at 08:20

## 2025-01-17 RX ADMIN — SODIUM CHLORIDE, PRESERVATIVE FREE 10 ML: 5 INJECTION INTRAVENOUS at 08:21

## 2025-01-17 RX ADMIN — METOPROLOL SUCCINATE 25 MG: 25 TABLET, EXTENDED RELEASE ORAL at 08:20

## 2025-01-17 RX ADMIN — OSELTAMIVIR PHOSPHATE 75 MG: 75 CAPSULE ORAL at 08:20

## 2025-01-17 NOTE — PROGRESS NOTES
AVS reviewed with patient and family, all questions answered. LDAs removed and pts belongings gathered by patient and family, pt refused wc but ambulated to exit. Pt alert oriented pink warm and dry at time of DC.

## 2025-01-17 NOTE — DISCHARGE SUMMARY
V2.0  Discharge Summary    Name:  Kian Frias /Age/Sex: 1960 (64 y.o. male)   Admit Date: 2025  Discharge Date: 25    MRN & CSN:  [id number] & [id number] Encounter Date and Time 25 12:22 PM EST    Attending:  Asya Kuhn,* Discharging Provider: Asya Guillen MD       Hospital Course:     Brief HPI: Kian Frias is a 64 y.o. male who presented with  hyperlipidemia, DAJUAN on CPAP was referred to ER from primary care physician's office for tachycardia.  Patient reported that he has been having cough for the last 2 days, body aches.  Patient admits to sick contacts at work.  Patient reports dry cough with intermittent phlegm.  Also described soreness from coughing.  Denied any chest pain.  Patient denied any fever or chills.  Denied any nausea, vomiting, abdominal pain, denying any urinary complaints, no constipation or diarrhea.  Patient denied any BRBPR, melena.  Vitals on arrival-/99, , RR 18, temp 98.2, saturating 98% on room air.  Labs significant for random glucose 160, NT proBNP 1765, troponin 171, repeat 191, 229, ALT 73, AST 78, hemoglobin 18.5.  D-dimer 2.80, influenza A detected.  CTA chest-no PE, bilateral hilar lymphadenopathy.  Patient received Cardizem 15 mg IV x 1, started on heparin drip in ER.    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  LHC on 1/15 reviewed - Has triple vessel disease. Plan for CABG  Discussed with CTS - Recommended to consult ID for influenza    As per ID -  recommended to wait 14 days from onset of influenza symptoms (after 2025) prior to any surgical intervention as underlying influenza places patient at higher risk for pneumonia, however, if patient becomes

## 2025-01-17 NOTE — PROGRESS NOTES
Cardiothoracic Surgery  IN-PATIENT SERVICE   [facility]    Daily Progress Note             Date:   1/17/2025  Patient name:  Kian Frias  Date of admission:  1/14/2025  4:11 PM  MRN:   [id number]  Account:  [id number]  YOB: 1960  PCP:    Rajeev Merida MD  Room:   2029/2029-A  Code Status:    Full Code    Physician Requesting Consult: Asya Kuhn,*    Reason for Consult:  CAD    Chief Complaint:     Shortness of breath, cough    History of Present Illness:     Patient is a 63yo male with history of hyperlipidemia, diabetes, prior tobacco abuse and DAJUAN wit CPAP. He  was sent to the ED this morning by his PCP with SOB, cough, and an abnormal EKG. Upon arrival, patient was found to be in afib RVR/SVT and was started on a cardizem gtt plus heparin gtt. Troponin elevated, consistent with NSTEMI. Tested + for influenza. Cardiac cath completed this afternoon which showed severe multivessel CAD.     Past Medical History:     Past Medical History:   Diagnosis Date    Coronary artery disease 01/15/2025    Heart murmur     \"as a baby assume I outgrew it \"    Hyperlipidemia     Kidney stone     \"one removed 20 yrs ago, passed a  stone 10 yrs ago- and have them in both kidneys now\"( 8/2017)    NSTEMI (non-ST elevated myocardial infarction) ([facility]) 01/15/2025    Sleep apnea     had sleep study done 5 + yrs ago- has cpap    SVT (supraventricular tachycardia) ([facility]) 01/15/2025        Past Surgical History:     Past Surgical History:   Procedure Laterality Date    CARDIAC PROCEDURE N/A 1/15/2025    Left heart cath / coronary angiography performed by Florencio Dubois MD at [facility] CARDIAC CATH LAB    COLONOSCOPY  2009    CYSTOSCOPY      stone manip - 20+ yrs ago    CYSTOSCOPY N/A 08/03/2017    with right retrograde pyelogram; right uteroscopy; right ureteral stent placement; laser lithotripsy        Medications Prior to Admission:     Prior to Admission  Evaluation    Kian Frias's information was entered into the STS Operative Risk calculator with the results as shown below.    [email or web address]      Procedure Type: Isolated CABG   Perioperative Outcome Estimate %   Operative Mortality 0.75%   Morbidity & Mortality 4.37%   Stroke 0.494%   Renal Failure 0.48%   Reoperation 2.09%   Prolonged Ventilation 2.28%   Deep Sternal Wound Infection 0.148%   Long Hospital Stay (>14 days) 1.59%   Short Hospital Stay (<6 days)* 66.8%         Clinical Summary       Planned Surgery: Isolated CABG, Elective, First cardiovascular surgery   Demographics: 64 year old, male, 102kg, 177cm, BMI: 32.6 kg/m²   Lab Values: Creatinine: 1.1 mg/dL, Hematocrit: 49.6%, WBC Count: 5.9 10³/?L, Platelet Count: 306404 cells/?L   Substance Abuse: Former smoker   Risk Factors / Comorbidities: Diabetes Mellitus   Coronary Artery Disease: 3 vessels diseased, Proximal LAD Stenosis >= 70%, Non-ST Elevation MI, MI: 1 to 7 Days   Valve Disease: Trivial/Trace MR, Trivial/Trace TR   Arrhythmia: Recent V. Tach / V. Fib         Assessment :      CAD  NSTEMI  SVT  Influenza      Is the patient on a blood thinner? Heparin gtt  Most recent dental exam: unknown  Significant allergies: none  Beta-blocker started? no  ACEi/ARBs held? NA  History of afib?no If yes -> size of LA, duration, h/o ablations?    Plan:       Recommendation:  I believe Mr. Frias would benefit optimally from CABG.    Vein mapping and carotid duplex ordered.  Bedside spirometry to be ordered once recovered from Influenza  ID consult for influenza and recommendations on how long surgery should wait.   Medical management per cardiology  Timing for CABG TBD pending recovery from influenza-   DC home and return for OP CABG.       Patient seen and evaluated in conjunction with supervising physician.  Discussed with Dr Cruz and Dr. Alamo.  Further recommendations per CT surgeon.  Plan as explained to the patient.  He

## 2025-01-17 NOTE — PROGRESS NOTES
Physician Progress Note      PATIENT:               AGNIESZKA RAMÍREZ  CSN #:                  [id number]  :                       1960  ADMIT DATE:       2025 4:11 PM  DISCH DATE:  RESPONDING  PROVIDER #:        Asya Guillen MD          QUERY TEXT:    Dear Dr. Jerome Guillen,  Pt admitted with SVT, Influenza A, Newly diagnosed DM and has noted elevated   troponin.  Noted documentation of NSTEMI on 1/15 by ordered Cardiothoracic   surgery consultant.  If possible, please document in progress notes and   discharge summary:    The medical record reflects the following:  Risk Factors: Hx HLD, sleep apnea, heart murmur, Current SVT, Influenza, newly   diagnosed DM, noted multivessel CAD s/p cath and plan for CABG  Clinical Indicators:  ED for rapid HR and CP, Cardiology-Supraventricular   tachycardia most probably AVNRT,  troponin 171, repeat 191, 229. 1/15   Cardiology notes \"elevated troponin\" and Cath record notes \" angina, ongoing   CP\". Cardiothoracic Surgery notes \"Troponin elevated, consistent with NSTEMI\"   and \"NSTEMI\" noted in assessment.  Treatment: trend troponin, Cardiology consult, Hep gtt, Cardiac Cath, plan for   CABG  Thank you,  Jennifer Colon RN, CDS  Options provided:  -- NSTEMI confirmed present on admission  -- Defer to Cardiothoracic surgery consultant documentation regarding NSTEMI  -- NSTEMI ruled out  -- Other - I will add my own diagnosis  -- Disagree - Not applicable / Not valid  -- Disagree - Clinically unable to determine / Unknown  -- Refer to Clinical Documentation Reviewer    PROVIDER RESPONSE TEXT:    I defer to Cardiothoracic Surgery consultant regarding documentation of   NSTEMI.    Query created by: Jennifer Oconnell on 2025 7:22 AM      Electronically signed by:  Asya Guillen MD 2025 8:55 AM

## 2025-01-17 NOTE — TELEPHONE ENCOUNTER
Per TIAN Rasmussen Steven E. (Legal Name)   64 y.o., 1960  needs consult with JRSHY on 1/28/25 for CABG   will need bedside spirometry before appointment if phi can put that order in and sylvia can schedule     Appointment schedule don 1/28/2025 at 1200. Mirella is aware.

## 2025-01-17 NOTE — PROGRESS NOTES
ADDENDUM    HPI:  I have reviewed the above HPI  And agree with above     Pulse Range: Pulse  Av.7  Min: 69  Max: 85    Blood Presuure Range:  Systolic (24hrs), Av , Min:145 , Max:164   ; Diastolic (24hrs), Av, Min:81, Max:102      Pulse ox Range: SpO2  Av %  Min: 94 %  Max: 99 %    24hr I & O:    Intake/Output Summary (Last 24 hours) at 2025 1330  Last data filed at 2025 1347  Gross per 24 hour   Intake 360 ml   Output --   Net 360 ml         BP (!) 145/85   Pulse 76   Temp 97.8 °F (36.6 °C) (Oral)   Resp 20   Ht 1.778 m (5' 10\")   Wt 97 kg (213 lb 13.5 oz)   SpO2 97%   BMI 30.68 kg/m²       Physical Exam:  General:  Awake, alert, NAD  Head:normal  Eye:normal  Neck:  No JVD   Chest:  Clear to auscultation, respiration easy  Cardiovascular:  RRR S1S2  Abdomen:   nontender  Extremities:  tr edema  Pulses; palpable  Neuro: grossly normal      MEDICAL DECISION MAKING;    Pt assessed , chart reviewed, patient examined examined , all available data was reviewed, following is the plan which was discussed with NINA as well:    Discussed with cardiothoracic surgery they want to wait as the patient had flu in the meantime medical management maximized with GDMT for coronary artery disease     Patient's troponin are markedly elevated and patient had some chest discomfort and cardiac catheterization with severe triple-vessel disease however the patient is fluid hence will wait we will continue with medical therapy for now     Supraventricular tachycardia probably AVNRT converted to sinus with adenosine at the present time he is in sinus rhythm we will continue to monitor  Cardizem added will continue     Will also rule out any other possible etiologies such as pulmonary embolism with a CTA chest     Will obtain echocardiogram for further clarification and check ejection fraction as well     Serial troponins are being monitored as well     Also need to rule out endocrinopathy such as thyroid

## 2025-01-17 NOTE — DISCHARGE INSTRUCTIONS
Your appointment with cardiothoracic surgery has been scheduled for 1/28/25 12 PM. 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

## 2025-01-21 NOTE — PROGRESS NOTES
Physician Progress Note      PATIENT:               AGNIESZKA RAMÍREZ  CSN #:                  [id number]  :                       1960  ADMIT DATE:       2025 4:11 PM  DISCH DATE:        2025 3:12 PM  RESPONDING  PROVIDER #:        JOANNA RIVERA - CNP          QUERY TEXT:    Dear Joanna Brown, LARRY, and/or Dr. Rodriguez,  Patient admitted with NSTEMI, SVT and Influenza A and found to have   multivessel CAD. Noted documentation of acute respiratory failure in  ID   consult note. In order to support the diagnosis of acute respiratory failure,   please include additional clinical indicators in your documentation.  Or   please document if the diagnosis of acute respiratory failure has been ruled   out after further study.    The medical record reflects the following:  Risk Factors: Hx HLD and DAJUAN with use of CPAP, Current Influenza A, NSTEMI,   CAD, new diagnosed DM2  Clinical Indicators: ED  rapid HR and CP, SVT converted to SR, trop   elevated, +Influenza, reports cough x 2 days prior, resp=18/min,    flowsheet noted some resp rates 32/min. H and P notes 98% sat on RA, \"No   respiratory distress noted\". 1/15 RN notes \"Placed pt on 2L O2 NC since RT was   unable to locate CPAP machine\". 1/15 PCP notes \"cough is better today. Denied   any chest pain or SOB\"  ID notes \"? Influenza A  ? It was complicated by acute hypoxic respiratory failure but now that has   resolved.  Symptom onset was on 2025. He has improved with Tamflu\"  Treatment: Tamiflu, O2 2L, O2 sat monitor  Thank you,  Jennifer Colon RN, CDS  Options provided:  -- Acute Respiratory Failure as evidenced by, Please document evidence.  -- Acute Respiratory Failure ruled out after study  -- Other - I will add my own diagnosis  -- Disagree - Not applicable / Not valid  -- Disagree - Clinically unable to determine / Unknown  -- Refer to Clinical Documentation Reviewer    PROVIDER RESPONSE TEXT:    Acute

## 2025-01-22 ENCOUNTER — TELEPHONE (OUTPATIENT)
Dept: CARDIOLOGY CLINIC | Age: 65
End: 2025-01-22

## 2025-01-22 NOTE — TELEPHONE ENCOUNTER
Left message for patient to call for OV this week.  Patient prefers to come after consult with Dr Amaya. Scheduled for 1/28

## 2025-01-27 NOTE — PROGRESS NOTES
gynecomastia.     Upper Abdomen: Both adrenal glands appear normal. There is mild splenomegaly   measuring 13.3 cm in AP dimension. Several bilateral renal stones are present.   One of larger 1's is on the left kidney measuring 0.8 cm.     IMPRESSION:  No CT evidence of pulmonary embolism seen in the lobar or segmental pulmonary   arteries bilaterally.     Bilateral hilar lymphadenopathy measuring up to 2.4 x 2 cm. No mediastinal or   axillary lymphadenopathy seen.     No pneumonia seen.     Bilateral renal stones. Please see above for more details.       Vein Mapping:  FINDINGS:      Right  GSV diameter (mm):     GSV at junction     4.7           Proximal thigh       3.2     Mid Thigh               2.8             Distal thigh             3.1      GSV at knee          2.1     Proximal Calf               2.2     Mid Calf                  1.8     Left  GSV diameter (mm):     GSV at junction   4.6     Proximal Thigh     2.9     Mid Thigh              2.3     Distal Thigh          1.6     GSV at knee         1.2     Prox Calf               1.5     Mid Calf                 1.8     GSV at ankle         1.7        IMPRESSION: Saphenous vein mapping as described.             Carotid Duplex:   IMPRESSION:      1.  The degree of stenosis in the right ICA is estimated to be in the less than   50% range.     2.  The degree of stenosis in the left ICA is estimated to be in the less than   50% range.     3.  No additional abnormalities are seen.    Hx of:  Afib: yes  PCI: no  Chest radiation: no      [employer] (STS) Short-Term Operative Risk Calculator Evaluation    Kian Frias 's information was entered into the STS Operative Risk calculator with the results as shown below. The information was thoroughly discussed with the patient and all questions were answered.    STS scoring:        Procedure Type: Isolated CABG   PERIOPERATIVE OUTCOME ESTIMATE %   Operative Mortality 0.75%   Morbidity & Mortality

## 2025-01-28 ENCOUNTER — INITIAL CONSULT (OUTPATIENT)
Dept: CARDIOTHORACIC SURGERY | Age: 65
End: 2025-01-28

## 2025-01-28 ENCOUNTER — OFFICE VISIT (OUTPATIENT)
Dept: CARDIOLOGY CLINIC | Age: 65
End: 2025-01-28
Payer: MEDICARE

## 2025-01-28 VITALS
BODY MASS INDEX: 30.66 KG/M2 | WEIGHT: 219 LBS | SYSTOLIC BLOOD PRESSURE: 138 MMHG | DIASTOLIC BLOOD PRESSURE: 80 MMHG | OXYGEN SATURATION: 99 % | HEART RATE: 62 BPM | HEIGHT: 71 IN

## 2025-01-28 VITALS
WEIGHT: 220.13 LBS | OXYGEN SATURATION: 97 % | BODY MASS INDEX: 31.51 KG/M2 | HEIGHT: 70 IN | HEART RATE: 62 BPM | SYSTOLIC BLOOD PRESSURE: 112 MMHG | DIASTOLIC BLOOD PRESSURE: 78 MMHG

## 2025-01-28 DIAGNOSIS — R06.00 DYSPNEA, UNSPECIFIED TYPE: ICD-10-CM

## 2025-01-28 DIAGNOSIS — Z13.1 SCREENING FOR DIABETES MELLITUS (DM): ICD-10-CM

## 2025-01-28 DIAGNOSIS — I21.4 NSTEMI (NON-ST ELEVATED MYOCARDIAL INFARCTION) (HCC): Primary | ICD-10-CM

## 2025-01-28 DIAGNOSIS — Z09 HOSPITAL DISCHARGE FOLLOW-UP: Primary | ICD-10-CM

## 2025-01-28 PROCEDURE — 99214 OFFICE O/P EST MOD 30 MIN: CPT | Performed by: INTERNAL MEDICINE

## 2025-01-28 PROCEDURE — 1111F DSCHRG MED/CURRENT MED MERGE: CPT | Performed by: INTERNAL MEDICINE

## 2025-01-28 PROCEDURE — 1124F ACP DISCUSS-NO DSCNMKR DOCD: CPT | Performed by: INTERNAL MEDICINE

## 2025-01-28 NOTE — PROGRESS NOTES
CARDIOLOGY NOTE      1/28/2025    RE: Kian Frias  (1960)                               TO:  Rajeev Mccall MD            CHIEF COMPLAINT   Kian is a 64 y.o. male who was seen today for management of  cad                                    HPI:                   Pt has h/o cad, htn, hyperlipiodimea, seen today for follow-up from hospitalization. Pt has no cardiac complaints  Patient has severe CAD and had SVT converted with adenosine  Awaiting CABG however the patient had flu like symptoms and has RSV hence was held  .hpisec  Kian Frias has the following history recorded in care path:  Patient Active Problem List    Diagnosis Date Noted    Influenza A 01/16/2025    NSTEMI (non-ST elevated myocardial infarction) ([facility]) 01/16/2025    SVT (supraventricular tachycardia) ([facility]) 01/14/2025    Ureteral stone 08/03/2017     Current Outpatient Medications   Medication Sig Dispense Refill    clopidogrel (PLAVIX) 75 MG tablet Take 1 tablet by mouth daily 30 tablet 3    metoprolol succinate (TOPROL XL) 25 MG extended release tablet Take 1 tablet by mouth daily 30 tablet 3    pravastatin (PRAVACHOL) 10 MG tablet Take 1 tablet by mouth nightly 30 tablet 3    metFORMIN (GLUCOPHAGE) 500 MG tablet Take 1 tablet by mouth 2 times daily (with meals) 60 tablet 3    latanoprost (XALATAN) 0.005 % ophthalmic solution Place 1 drop into both eyes nightly      aspirin 81 MG tablet Take 1 tablet by mouth daily (Patient not taking: Reported on 1/28/2025)      Multiple Vitamins-Minerals (THERAPEUTIC MULTIVITAMIN-MINERALS) tablet Take 1 tablet by mouth daily (Patient not taking: Reported on 1/14/2025)       No current facility-administered medications for this visit.     Allergies: Zocor [simvastatin]  Past Medical History:   Diagnosis Date    Coronary artery disease 01/15/2025    Heart murmur     \"as a baby assume I outgrew it \"    Hyperlipidemia     Kidney stone     \"one removed 20 yrs ago, passed a  stone

## 2025-01-29 ENCOUNTER — TELEPHONE (OUTPATIENT)
Dept: CARDIOTHORACIC SURGERY | Age: 65
End: 2025-01-29

## 2025-01-29 PROBLEM — I25.10 CORONARY ATHEROSCLEROSIS OF NATIVE CORONARY ARTERY: Status: ACTIVE | Noted: 2025-01-29

## 2025-01-29 NOTE — PROGRESS NOTES
1/29/25 - .LM on home and cell: pre-testing on 1/31/25 at 0800.   Bring insurance card and picture ID. Check in at the information desk in the lobby upon your arrival. You can eat and take morning medications. Please call the PAT Nurse for further instructions.

## 2025-01-29 NOTE — TELEPHONE ENCOUNTER
Auth has been submitted to [facility] through their portal, ref# [id number], currently pennding review

## 2025-01-29 NOTE — TELEPHONE ENCOUNTER
LM for patient to return my call.    Spoke with patient, advised of surgery date/time and medication letter reviewed. All questions answered.

## 2025-01-29 NOTE — TELEPHONE ENCOUNTER
Plan:  CABG: Scheduled on 2/5/2025 at 1000 with a 0800 arrival  PAT: Scheduled on 1/31/2025 at 0800  Bedside Spirometry: Scheduled on 1/31/2025 at 1130 during PAT  Bactroban: Sent    Cardiothoracic and Vascular Surgery    Pre-Operative Open Heart Patient Medication Letter    Surgeon: Dr. Cruz  Procedure: CABG  Date of Surgery: 2/5/2025   Time of Surgery: 1000  Arrival time of Surgery: 0800    In order to optimize the perioperative management and outcomes of surgery, please adhere to the following recommendations:    NEW medications to start before surgery:  Bactroban nasal ointment starting on 2/1/2025 until morning of surgery  Chlorhexidine(Hibiclens) starting on 2/4/2025- Last dose: 2/5/2025    Medications to STOP 2 weeks prior to surgery  Herbal medications and vitamin supplements    LAST DOSE of these medications will be on 1/29/2025 - 7 days prior to surgery  NSAIDS- excluding aspirin 81mg    LAST DOSE of these medications will be on 1/31/2025 - 5 days prior to surgery  Clopidogrel(Plavix)    LAST DOSE of these medications will be on 2/3/2025 - 2 days prior to surgery  Metformin(Glucophage)    LAST DOSE of these medications will be on 2/4/2025 - day before surgery  Pravastatin(Pravachol)    Medications to continue taking including the morning on the day of surgery (2/5/2025):  Latanoprost(Xalatan)  Metoprolol Succinate(ToprolXL)        Patient given instructions over telephone on 1/29/2025.  Surgery is scheduled for 2/5/2025 @ 1000, w/arrival @ 0800, @ [facility]. Medication/Education Letter gone over with patient. Questions answered, Patient voiced understanding.     Patient was notified that surgery date or time could be changed due to an emergency. Patient voiced understanding.

## 2025-01-30 ENCOUNTER — TELEPHONE (OUTPATIENT)
Dept: CARDIOTHORACIC SURGERY | Age: 65
End: 2025-01-30

## 2025-01-30 RX ORDER — SODIUM CHLORIDE 9 MG/ML
INJECTION, SOLUTION INTRAVENOUS PRN
OUTPATIENT
Start: 2025-01-30

## 2025-01-30 RX ORDER — SODIUM CHLORIDE 0.9 % (FLUSH) 0.9 %
5-40 SYRINGE (ML) INJECTION PRN
OUTPATIENT
Start: 2025-01-30

## 2025-01-30 RX ORDER — MUPIROCIN 20 MG/G
OINTMENT TOPICAL ONCE
OUTPATIENT
Start: 2025-01-30 | End: 2025-01-30

## 2025-01-30 RX ORDER — SODIUM CHLORIDE 0.9 % (FLUSH) 0.9 %
5-40 SYRINGE (ML) INJECTION EVERY 12 HOURS SCHEDULED
OUTPATIENT
Start: 2025-01-30

## 2025-01-30 RX ORDER — CHLORHEXIDINE GLUCONATE ORAL RINSE 1.2 MG/ML
15 SOLUTION DENTAL ONCE
OUTPATIENT
Start: 2025-01-30 | End: 2025-01-30

## 2025-01-30 RX ORDER — CHLORHEXIDINE GLUCONATE 40 MG/ML
SOLUTION TOPICAL ONCE
OUTPATIENT
Start: 2025-01-30 | End: 2025-01-30

## 2025-01-31 ENCOUNTER — HOSPITAL ENCOUNTER (OUTPATIENT)
Dept: PULMONOLOGY | Age: 65
Discharge: HOME | End: 2025-01-31
Payer: MEDICARE

## 2025-01-31 ENCOUNTER — HOSPITAL ENCOUNTER (OUTPATIENT)
Age: 65
End: 2025-01-31
Payer: MEDICARE

## 2025-01-31 ENCOUNTER — HOSPITAL ENCOUNTER (OUTPATIENT)
Dept: PREADMISSION TESTING | Age: 65
Discharge: HOME | End: 2025-01-31
Payer: MEDICARE

## 2025-01-31 DIAGNOSIS — Z13.1 SCREENING FOR DIABETES MELLITUS (DM): ICD-10-CM

## 2025-01-31 DIAGNOSIS — I21.4 NSTEMI (NON-ST ELEVATED MYOCARDIAL INFARCTION) (HCC): ICD-10-CM

## 2025-01-31 DIAGNOSIS — R06.00 DYSPNEA, UNSPECIFIED TYPE: ICD-10-CM

## 2025-01-31 LAB
ABO + RH BLD: NORMAL
ACTIVATED CLOTTING TIME, LOW RANGE: 259 SEC (ref 89–169)
ALBUMIN SERPL-MCNC: 4.4 G/DL (ref 3.4–5)
ALBUMIN/GLOB SERPL: 1.6 {RATIO} (ref 1.1–2.2)
ALP SERPL-CCNC: 64 U/L (ref 40–129)
ALT SERPL-CCNC: 42 U/L (ref 10–40)
ANION GAP SERPL CALCULATED.3IONS-SCNC: 10 MMOL/L (ref 9–17)
AST SERPL-CCNC: 32 U/L (ref 15–37)
BASOPHILS # BLD: 0.04 K/UL
BASOPHILS NFR BLD: 1 % (ref 0–1)
BILIRUB SERPL-MCNC: 0.6 MG/DL (ref 0–1)
BILIRUB UR QL STRIP: NEGATIVE
BLOOD BANK COMMENT: NORMAL
BLOOD BANK SAMPLE EXPIRATION: NORMAL
BLOOD GROUP ANTIBODIES SERPL: NEGATIVE
BUN SERPL-MCNC: 9 MG/DL (ref 7–20)
CALCIUM SERPL-MCNC: 9.9 MG/DL (ref 8.3–10.6)
CHLORIDE SERPL-SCNC: 101 MMOL/L (ref 99–110)
CLARITY UR: CLEAR
CO2 SERPL-SCNC: 28 MMOL/L (ref 21–32)
COLOR UR: YELLOW
COMMENT: ABNORMAL
CREAT SERPL-MCNC: 1 MG/DL (ref 0.8–1.3)
EKG ATRIAL RATE: 57 BPM
EKG DIAGNOSIS: NORMAL
EKG P AXIS: 28 DEGREES
EKG P-R INTERVAL: 140 MS
EKG Q-T INTERVAL: 398 MS
EKG QRS DURATION: 90 MS
EKG QTC CALCULATION (BAZETT): 387 MS
EKG R AXIS: -14 DEGREES
EKG T AXIS: -6 DEGREES
EKG VENTRICULAR RATE: 57 BPM
EOSINOPHIL # BLD: 0.29 K/UL
EOSINOPHILS RELATIVE PERCENT: 5 % (ref 0–3)
ERYTHROCYTE [DISTWIDTH] IN BLOOD BY AUTOMATED COUNT: 13.2 % (ref 11.7–14.9)
EST. AVERAGE GLUCOSE BLD GHB EST-MCNC: 140 MG/DL
GFR, ESTIMATED: 72 ML/MIN/1.73M2
GLUCOSE SERPL-MCNC: 86 MG/DL (ref 74–99)
GLUCOSE UR STRIP-MCNC: NEGATIVE MG/DL
HBA1C MFR BLD: 6.5 % (ref 4.2–6.3)
HCT VFR BLD AUTO: 48.7 % (ref 42–52)
HGB BLD-MCNC: 16.3 G/DL (ref 13.5–18)
HGB UR QL STRIP.AUTO: NEGATIVE
IMM GRANULOCYTES # BLD AUTO: 0.02 K/UL
IMM GRANULOCYTES NFR BLD: 0 %
INR PPP: 1
KETONES UR STRIP-MCNC: NEGATIVE MG/DL
LEUKOCYTE ESTERASE UR QL STRIP: NEGATIVE
LYMPHOCYTES NFR BLD: 0.76 K/UL
LYMPHOCYTES RELATIVE PERCENT: 13 % (ref 24–44)
MAGNESIUM SERPL-MCNC: 2.2 MG/DL (ref 1.8–2.4)
MCH RBC QN AUTO: 30.8 PG (ref 27–31)
MCHC RBC AUTO-ENTMCNC: 33.5 G/DL (ref 32–36)
MCV RBC AUTO: 92.1 FL (ref 78–100)
MONOCYTES NFR BLD: 0.79 K/UL
MONOCYTES NFR BLD: 14 % (ref 0–4)
MRSA, DNA, NASAL: NOT DETECTED
NEUTROPHILS NFR BLD: 67 % (ref 36–66)
NEUTS SEG NFR BLD: 3.81 K/UL
NITRITE UR QL STRIP: NEGATIVE
PARTIAL THROMBOPLASTIN TIME: 29 SEC (ref 25.1–37.1)
PH UR STRIP: 7 [PH] (ref 5–8)
PLATELET # BLD AUTO: 259 K/UL (ref 140–440)
PMV BLD AUTO: 10.1 FL (ref 7.5–11.1)
POTASSIUM SERPL-SCNC: 4.4 MMOL/L (ref 3.5–5.1)
PROT SERPL-MCNC: 7.2 G/DL (ref 6.4–8.2)
PROT UR STRIP-MCNC: NEGATIVE MG/DL
PROTHROMBIN TIME: 13.5 SEC (ref 11.7–14.5)
RBC # BLD AUTO: 5.29 M/UL (ref 4.6–6.2)
SODIUM SERPL-SCNC: 139 MMOL/L (ref 136–145)
SP GR UR STRIP: <1.005 (ref 1–1.03)
SPECIMEN DESCRIPTION: NORMAL
TSH SERPL DL<=0.05 MIU/L-ACNC: 1.91 UIU/ML (ref 0.27–4.2)
UROBILINOGEN UR STRIP-ACNC: 0.2 EU/DL (ref 0–1)
WBC OTHER # BLD: 5.7 K/UL (ref 4–10.5)

## 2025-01-31 PROCEDURE — 85025 COMPLETE CBC W/AUTO DIFF WBC: CPT

## 2025-01-31 PROCEDURE — 83036 HEMOGLOBIN GLYCOSYLATED A1C: CPT

## 2025-01-31 PROCEDURE — 80053 COMPREHEN METABOLIC PANEL: CPT

## 2025-01-31 PROCEDURE — 93010 ELECTROCARDIOGRAM REPORT: CPT | Performed by: INTERNAL MEDICINE

## 2025-01-31 PROCEDURE — 86900 BLOOD TYPING SEROLOGIC ABO: CPT

## 2025-01-31 PROCEDURE — 86901 BLOOD TYPING SEROLOGIC RH(D): CPT

## 2025-01-31 PROCEDURE — 81003 URINALYSIS AUTO W/O SCOPE: CPT

## 2025-01-31 PROCEDURE — 93005 ELECTROCARDIOGRAM TRACING: CPT

## 2025-01-31 PROCEDURE — 85730 THROMBOPLASTIN TIME PARTIAL: CPT

## 2025-01-31 PROCEDURE — 94010 BREATHING CAPACITY TEST: CPT

## 2025-01-31 PROCEDURE — 83735 ASSAY OF MAGNESIUM: CPT

## 2025-01-31 PROCEDURE — 84443 ASSAY THYROID STIM HORMONE: CPT

## 2025-01-31 PROCEDURE — 85610 PROTHROMBIN TIME: CPT

## 2025-01-31 PROCEDURE — 36415 COLL VENOUS BLD VENIPUNCTURE: CPT

## 2025-01-31 PROCEDURE — 86920 COMPATIBILITY TEST SPIN: CPT

## 2025-01-31 PROCEDURE — 87641 MR-STAPH DNA AMP PROBE: CPT

## 2025-01-31 PROCEDURE — 86850 RBC ANTIBODY SCREEN: CPT

## 2025-01-31 RX ORDER — MUPIROCIN 20 MG/G
OINTMENT TOPICAL
Qty: 15 G | Refills: 0 | Status: SHIPPED | OUTPATIENT
Start: 2025-02-01

## 2025-01-31 NOTE — PROGRESS NOTES
Bedside Spirometry - pft    FVC               Actual  2.85   ---  66   %Predicted  FEV1             Actual  2.69   ---   80   %Predicted  FEV1/FVC     Actual  94.4   ---  125  %Predicted  FEF 25-75     Actual  4.88    ---  144 %Predicted      MACHINE INTERPRETATION:  POSSIBLE MODERATE RESTRICTION

## 2025-01-31 NOTE — PROGRESS NOTES
CVICU Open Heart Risk Factor Score    STS Criteria  Possible Score Yes/No Score Notes   Emergency Case 6 No  Mag- 2.2  Ca- 9.9  K- 4.4   Serum Creatinine     Cr- 1.0   >1.2 0 No     >1.6 to <1.8mg/dl 1 No     >.1.9 4 No     Acute/Chronic Renal Failure/Renal Insufficiency 0   No  Cr- 1.0  GFR- 72   Left Ventricular Dysfunction(EF<40%) 3   No  EF- 65-70%   Operative Mitral Valve Insufficiency 3   No     Reoperation 3 No     Age >65 and <74 years 1  Yes   1 Age- 65   Age >75 years 2 No     Prior Vascular Surgery 2   No     COPD +History of Patient Use of Bronchodilators 2   No     COPD 0 No  PFT- Possible Moderate Restriction  FEV1 - 80   Current Smoker of History of Smoking  0   Yes 0 1 PPD for 30 years  Quit 1/2/2007   Anemia (Hematocrit<0.34) 2   No  Hct- 48.7   ASA / Anticoagulants/ Bleeding Tendencies / Antiplatelets 0   No  PT- 13.5  INR- 1.0  APTT- 29.0  PLT- 259   Operative Aortic Valve Stenosis 1 No       Weight<143 lbs (  BMI<18.5) 1   No  219 lb  99.3 kg  BMI- 30.98  Ht- 5'10 in   Weight >200 lbs (BMI >30    0   Yes 0    Diabetes Oral or Insulin Therapy 1   Yes 1 HA1C- 6.5  DM Type- 2  Home meds- Metformin   Cerebrovascular Disease 1   No     Impaired Mobility 0 No     Walker/Cane/Wheelchair 0 No     Recent MI 0 No     Hypertension 0 Yes 0 Home meds-   Toprol XL   Peripheral Vascular Disease 0 No     Lives Alone 0 No     Other (GI Auto Immune Hepatic etc) 0 No  -  -  -   TOTAL   2    Note The surgeon must be notified for all risk scores >7

## 2025-01-31 NOTE — PROGRESS NOTES
Chart reviewed by cardiac rehab nurse. Met patient in lobby. Introduced myself and teaching plan.  Patient's planned procedure is CABG. Teaching done on A&P of the heart and formation of CAD. Explained the surgical process, Pre-Op,Inter-Op and Post-Op. Discussed length of stay and recovery.       Explanation given of pre op routine tests, lines/tubes/equipment, and infection control. Educated on weaning from ventilator, medication and equipment to expect, on progressive activity, post op pain, and how it will be managed. Encouraged pt to communicate about pain in order to create a partnership for his recovery success. Discussed importance of coughing and deep breathing and sternal precautions. Introduced multidisciplinary approach and daily routine in CVICU. Pt verbalized commitment to recovery program.        Teaching done on post discharge recovery, activity guidelines, and weight monitoring. Discussed follow up appointments, possibility of ECF, role of home health, and family involvement. Introduced benefits of out patient cardiac rehab after appropriate time frame. Went over visitation policy with patient. Given Understanding Heart Surgery book. Escorted to main exit with all personal belongings as well as items given to patient from facility for upcoming surgery.

## 2025-02-02 ENCOUNTER — ANESTHESIA EVENT (OUTPATIENT)
Dept: OPERATING ROOM | Age: 65
End: 2025-02-02
Payer: MEDICARE

## 2025-02-02 NOTE — ANESTHESIA PRE PROCEDURE
Department of Anesthesiology  Preprocedure Note       Name:  Kian Frias   Age:  64 y.o.  :  1960                                          MRN:  [id number]         Date:  2025      Surgeon: Surgeon(s):  Robert Cruz MD    Procedure: Procedure(s):  CORONARY ARTERY BYPASS GRAFT WITH [facility]    Medications prior to admission:   Prior to Admission medications    Medication Sig Start Date End Date Taking? Authorizing Provider   mupirocin (BACTROBAN) 2 % ointment Apply intranasally daily for 5 days prior to surgery 25   Eugenia Huggins PA-C   clopidogrel (PLAVIX) 75 MG tablet Take 1 tablet by mouth daily 25   Asya Kuhn MD   metoprolol succinate (TOPROL XL) 25 MG extended release tablet Take 1 tablet by mouth daily 25   Asya Kuhn MD   pravastatin (PRAVACHOL) 10 MG tablet Take 1 tablet by mouth nightly 25   Asya Kuhn MD   metFORMIN (GLUCOPHAGE) 500 MG tablet Take 1 tablet by mouth 2 times daily (with meals) 25   Asya Kuhn MD   latanoprost (XALATAN) 0.005 % ophthalmic solution Place 1 drop into both eyes nightly    Kevin Vega MD   aspirin 81 MG tablet Take 1 tablet by mouth daily  Patient not taking: Reported on 2025    Kevin Vega MD   Multiple Vitamins-Minerals (THERAPEUTIC MULTIVITAMIN-MINERALS) tablet Take 1 tablet by mouth daily  Patient not taking: Reported on 2025    Kevin Vega MD       Current medications:    No current facility-administered medications for this encounter.     Current Outpatient Medications   Medication Sig Dispense Refill    mupirocin (BACTROBAN) 2 % ointment Apply intranasally daily for 5 days prior to surgery 15 g 0    clopidogrel (PLAVIX) 75 MG tablet Take 1 tablet by mouth daily 30 tablet 3    metoprolol succinate (TOPROL XL) 25 MG extended release tablet Take 1 tablet by mouth daily 30 tablet 3    pravastatin (PRAVACHOL) 10 MG tablet Take 1 tablet by

## 2025-02-03 RX ORDER — ECHINACEA PURPUREA AERIAL 350 MG
CAPSULE ORAL DAILY
Status: ON HOLD | COMMUNITY
End: 2025-02-09 | Stop reason: HOSPADM

## 2025-02-03 RX ORDER — AMPICILLIN TRIHYDRATE 250 MG
CAPSULE ORAL DAILY
Status: ON HOLD | COMMUNITY
End: 2025-02-09 | Stop reason: HOSPADM

## 2025-02-03 RX ORDER — UBIDECARENONE 30 MG
30 CAPSULE ORAL DAILY
Status: ON HOLD | COMMUNITY
End: 2025-02-09 | Stop reason: HOSPADM

## 2025-02-05 ENCOUNTER — ANESTHESIA (OUTPATIENT)
Dept: OPERATING ROOM | Age: 65
End: 2025-02-05
Payer: MEDICARE

## 2025-02-05 ENCOUNTER — APPOINTMENT (OUTPATIENT)
Dept: GENERAL RADIOLOGY | Age: 65
DRG: 233 | End: 2025-02-05
Attending: THORACIC SURGERY (CARDIOTHORACIC VASCULAR SURGERY)
Payer: MEDICARE

## 2025-02-05 ENCOUNTER — HOSPITAL ENCOUNTER (INPATIENT)
Age: 65
LOS: 4 days | Discharge: HOME | DRG: 233 | End: 2025-02-09
Attending: THORACIC SURGERY (CARDIOTHORACIC VASCULAR SURGERY) | Admitting: THORACIC SURGERY (CARDIOTHORACIC VASCULAR SURGERY)
Payer: MEDICARE

## 2025-02-05 DIAGNOSIS — I21.3 STEMI (ST ELEVATION MYOCARDIAL INFARCTION) (HCC): Primary | ICD-10-CM

## 2025-02-05 DIAGNOSIS — G89.18 ACUTE POST-OPERATIVE PAIN: ICD-10-CM

## 2025-02-05 DIAGNOSIS — Z95.1 S/P CABG (CORONARY ARTERY BYPASS GRAFT): ICD-10-CM

## 2025-02-05 PROBLEM — I25.10 CAD IN NATIVE ARTERY: Status: ACTIVE | Noted: 2025-02-05

## 2025-02-05 LAB
ACTIVATED CLOTTING TIME, HIGH RANGE: 127 SEC (ref 81–125)
ACTIVATED CLOTTING TIME, HIGH RANGE: 517 SEC (ref 81–125)
ACTIVATED CLOTTING TIME, HIGH RANGE: 636 SEC (ref 81–125)
ACTIVATED CLOTTING TIME, HIGH RANGE: 692 SEC (ref 81–125)
ANION GAP SERPL CALCULATED.3IONS-SCNC: 10 MMOL/L (ref 9–17)
ANION GAP SERPL CALCULATED.3IONS-SCNC: 11 MMOL/L (ref 9–17)
ANION GAP SERPL CALCULATED.3IONS-SCNC: 7 MMOL/L (ref 9–17)
BUN BLD-MCNC: 5 MG/DL (ref 7–20)
BUN BLD-MCNC: 6 MG/DL (ref 7–20)
BUN BLD-MCNC: 7 MG/DL (ref 7–20)
BUN BLD-MCNC: 8 MG/DL (ref 7–20)
BUN BLD-MCNC: 8 MG/DL (ref 7–20)
BUN BLD-MCNC: 9 MG/DL (ref 7–20)
BUN SERPL-MCNC: 10 MG/DL (ref 7–20)
BUN SERPL-MCNC: 7 MG/DL (ref 7–20)
BUN SERPL-MCNC: 8 MG/DL (ref 7–20)
CA-I BLD-SCNC: 1.12 MMOL/L (ref 1.15–1.33)
CA-I BLD-SCNC: 1.19 MMOL/L (ref 1.15–1.33)
CA-I BLD-SCNC: 1.21 MMOL/L (ref 1.15–1.33)
CA-I BLD-SCNC: 1.22 MMOL/L (ref 1.15–1.33)
CA-I BLD-SCNC: 1.22 MMOL/L (ref 1.15–1.33)
CA-I BLD-SCNC: 1.24 MMOL/L (ref 1.15–1.33)
CA-I BLD-SCNC: 1.25 MMOL/L (ref 1.15–1.33)
CA-I BLD-SCNC: 1.26 MMOL/L (ref 1.15–1.33)
CA-I BLD-SCNC: 1.27 MMOL/L (ref 1.15–1.33)
CA-I BLD-SCNC: 1.28 MMOL/L (ref 1.15–1.33)
CA-I BLD-SCNC: 1.29 MMOL/L (ref 1.15–1.33)
CA-I BLD-SCNC: 1.3 MMOL/L (ref 1.15–1.33)
CA-I BLD-SCNC: 1.39 MMOL/L (ref 1.15–1.33)
CA-I BLD-SCNC: 1.44 MMOL/L (ref 1.15–1.33)
CA-I BLD-SCNC: 1.51 MMOL/L (ref 1.15–1.33)
CALCIUM SERPL-MCNC: 10 MG/DL (ref 8.3–10.6)
CALCIUM SERPL-MCNC: 8.7 MG/DL (ref 8.3–10.6)
CALCIUM SERPL-MCNC: 9.5 MG/DL (ref 8.3–10.6)
CHLORIDE BLD-SCNC: 106 MMOL/L (ref 99–110)
CHLORIDE BLD-SCNC: 108 MMOL/L (ref 99–110)
CHLORIDE BLD-SCNC: 108 MMOL/L (ref 99–110)
CHLORIDE BLD-SCNC: 111 MMOL/L (ref 99–110)
CHLORIDE BLD-SCNC: 112 MMOL/L (ref 99–110)
CHLORIDE BLD-SCNC: 113 MMOL/L (ref 99–110)
CHLORIDE BLD-SCNC: 113 MMOL/L (ref 99–110)
CHLORIDE BLD-SCNC: 114 MMOL/L (ref 99–110)
CHLORIDE BLD-SCNC: 115 MMOL/L (ref 99–110)
CHLORIDE BLD-SCNC: 115 MMOL/L (ref 99–110)
CHLORIDE SERPL-SCNC: 103 MMOL/L (ref 99–110)
CHLORIDE SERPL-SCNC: 111 MMOL/L (ref 99–110)
CHLORIDE SERPL-SCNC: 111 MMOL/L (ref 99–110)
CO2 SERPL-SCNC: 22 MMOL/L (ref 21–32)
CO2 SERPL-SCNC: 24 MMOL/L (ref 21–32)
CO2 SERPL-SCNC: 26 MMOL/L (ref 21–32)
CREAT BLD-MCNC: 0.7 MG/DL (ref 0.5–1.2)
CREAT BLD-MCNC: 0.7 MG/DL (ref 0.5–1.2)
CREAT BLD-MCNC: 0.8 MG/DL (ref 0.5–1.2)
CREAT BLD-MCNC: 0.9 MG/DL (ref 0.5–1.2)
CREAT BLD-MCNC: 1 MG/DL (ref 0.5–1.2)
CREAT BLD-MCNC: 1.1 MG/DL (ref 0.5–1.2)
CREAT BLD-MCNC: 1.1 MG/DL (ref 0.5–1.2)
CREAT SERPL-MCNC: 0.8 MG/DL (ref 0.8–1.3)
CREAT SERPL-MCNC: 0.9 MG/DL (ref 0.8–1.3)
CREAT SERPL-MCNC: 1 MG/DL (ref 0.8–1.3)
EGFR, POC: 74 ML/MIN/1.73M2
EGFR, POC: 74 ML/MIN/1.73M2
EGFR, POC: 84 ML/MIN/1.73M2
EGFR, POC: >90 ML/MIN/1.73M2
ERYTHROCYTE [DISTWIDTH] IN BLOOD BY AUTOMATED COUNT: 13.1 % (ref 11.7–14.9)
ERYTHROCYTE [DISTWIDTH] IN BLOOD BY AUTOMATED COUNT: 13.3 % (ref 11.7–14.9)
FIBRINOGEN PPP-MCNC: 148 MG/DL (ref 170–540)
GFR, ESTIMATED: 77 ML/MIN/1.73M2
GFR, ESTIMATED: 85 ML/MIN/1.73M2
GFR, ESTIMATED: >90 ML/MIN/1.73M2
GLUCOSE BLD-MCNC: 103 MG/DL (ref 74–99)
GLUCOSE BLD-MCNC: 112 MG/DL (ref 74–99)
GLUCOSE BLD-MCNC: 119 MG/DL (ref 74–99)
GLUCOSE BLD-MCNC: 119 MG/DL (ref 74–99)
GLUCOSE BLD-MCNC: 120 MG/DL (ref 74–99)
GLUCOSE BLD-MCNC: 122 MG/DL (ref 74–99)
GLUCOSE BLD-MCNC: 124 MG/DL (ref 74–99)
GLUCOSE BLD-MCNC: 126 MG/DL (ref 74–99)
GLUCOSE BLD-MCNC: 129 MG/DL (ref 74–99)
GLUCOSE BLD-MCNC: 133 MG/DL (ref 74–99)
GLUCOSE BLD-MCNC: 135 MG/DL (ref 74–99)
GLUCOSE BLD-MCNC: 137 MG/DL (ref 74–99)
GLUCOSE BLD-MCNC: 140 MG/DL (ref 74–99)
GLUCOSE BLD-MCNC: 143 MG/DL (ref 74–99)
GLUCOSE BLD-MCNC: 144 MG/DL (ref 74–99)
GLUCOSE BLD-MCNC: 144 MG/DL (ref 74–99)
GLUCOSE BLD-MCNC: 150 MG/DL (ref 74–99)
GLUCOSE BLD-MCNC: 165 MG/DL (ref 74–99)
GLUCOSE BLD-MCNC: 179 MG/DL (ref 74–99)
GLUCOSE SERPL-MCNC: 119 MG/DL (ref 74–99)
GLUCOSE SERPL-MCNC: 129 MG/DL (ref 74–99)
GLUCOSE SERPL-MCNC: 133 MG/DL (ref 74–99)
HCT VFR BLD AUTO: 30 % (ref 38–51)
HCT VFR BLD AUTO: 31 % (ref 38–51)
HCT VFR BLD AUTO: 31 % (ref 38–51)
HCT VFR BLD AUTO: 32 % (ref 38–51)
HCT VFR BLD AUTO: 36 % (ref 38–51)
HCT VFR BLD AUTO: 37.6 % (ref 42–52)
HCT VFR BLD AUTO: 39 % (ref 38–51)
HCT VFR BLD AUTO: 39 % (ref 38–51)
HCT VFR BLD AUTO: 40 % (ref 38–51)
HCT VFR BLD AUTO: 41 % (ref 38–51)
HCT VFR BLD AUTO: 41 % (ref 38–51)
HCT VFR BLD AUTO: 41.3 % (ref 42–52)
HCT VFR BLD AUTO: 42 % (ref 38–51)
HCT VFR BLD AUTO: 42 % (ref 38–51)
HCT VFR BLD AUTO: 43 % (ref 38–51)
HCT VFR BLD AUTO: 43 % (ref 38–51)
HGB BLD-MCNC: 13 G/DL (ref 13.5–18)
HGB BLD-MCNC: 14.2 G/DL (ref 13.5–18)
INR PPP: 1.1
INR PPP: 1.3
MAGNESIUM SERPL-MCNC: 2.2 MG/DL (ref 1.8–2.4)
MAGNESIUM SERPL-MCNC: 2.2 MG/DL (ref 1.8–2.4)
MAGNESIUM SERPL-MCNC: 2.7 MG/DL (ref 1.8–2.4)
MCH RBC QN AUTO: 31.4 PG (ref 27–31)
MCH RBC QN AUTO: 31.6 PG (ref 27–31)
MCHC RBC AUTO-ENTMCNC: 34.4 G/DL (ref 32–36)
MCHC RBC AUTO-ENTMCNC: 34.6 G/DL (ref 32–36)
MCV RBC AUTO: 91.3 FL (ref 78–100)
MCV RBC AUTO: 91.4 FL (ref 78–100)
NEGATIVE BASE EXCESS, ART: 0 MMOL/L (ref 0–3)
NEGATIVE BASE EXCESS, ART: 0.4 MMOL/L (ref 0–3)
NEGATIVE BASE EXCESS, ART: 0.7 MMOL/L (ref 0–3)
NEGATIVE BASE EXCESS, ART: 1.2 MMOL/L (ref 0–3)
NEGATIVE BASE EXCESS, ART: 1.5 MMOL/L (ref 0–3)
NEGATIVE BASE EXCESS, ART: 1.5 MMOL/L (ref 0–3)
NEGATIVE BASE EXCESS, ART: 2.5 MMOL/L (ref 0–3)
NEGATIVE BASE EXCESS, ART: 2.6 MMOL/L (ref 0–3)
NEGATIVE BASE EXCESS, ART: 2.8 MMOL/L (ref 0–3)
NEGATIVE BASE EXCESS, ART: 3.4 MMOL/L (ref 0–3)
PARTIAL THROMBOPLASTIN TIME: 31.4 SEC (ref 25.1–37.1)
PLATELET # BLD AUTO: 141 K/UL (ref 140–440)
PLATELET # BLD AUTO: 217 K/UL (ref 140–440)
PMV BLD AUTO: 10.6 FL (ref 7.5–11.1)
PMV BLD AUTO: 10.8 FL (ref 7.5–11.1)
POC ANION GAP: 10 MMOL/L (ref 7–16)
POC ANION GAP: 11 MMOL/L (ref 7–16)
POC ANION GAP: 12 MMOL/L (ref 7–16)
POC ANION GAP: 4 MMOL/L (ref 7–16)
POC ANION GAP: 5 MMOL/L (ref 7–16)
POC ANION GAP: 6 MMOL/L (ref 7–16)
POC ANION GAP: 7 MMOL/L (ref 7–16)
POC ANION GAP: 8 MMOL/L (ref 7–16)
POC ANION GAP: 9 MMOL/L (ref 7–16)
POC HCO3: 20 MMOL/L (ref 21–28)
POC HCO3: 21.5 MMOL/L (ref 21–28)
POC HCO3: 21.7 MMOL/L (ref 21–28)
POC HCO3: 22.2 MMOL/L (ref 21–28)
POC HCO3: 22.9 MMOL/L (ref 21–28)
POC HCO3: 23 MMOL/L (ref 21–28)
POC HCO3: 23.7 MMOL/L (ref 21–28)
POC HCO3: 23.9 MMOL/L (ref 21–28)
POC HCO3: 24 MMOL/L (ref 21–28)
POC HCO3: 24.2 MMOL/L (ref 21–28)
POC HCO3: 25 MMOL/L (ref 21–28)
POC HCO3: 26.4 MMOL/L (ref 21–28)
POC HCO3: 26.6 MMOL/L (ref 21–28)
POC HCO3: 27.1 MMOL/L (ref 21–28)
POC HEMOGLOBIN (CALC): 10.1 G/DL (ref 12–17)
POC HEMOGLOBIN (CALC): 10.6 G/DL (ref 12–17)
POC HEMOGLOBIN (CALC): 10.7 G/DL (ref 12–17)
POC HEMOGLOBIN (CALC): 10.8 G/DL (ref 12–17)
POC HEMOGLOBIN (CALC): 12.4 G/DL (ref 12–17)
POC HEMOGLOBIN (CALC): 13.1 G/DL (ref 12–17)
POC HEMOGLOBIN (CALC): 13.4 G/DL (ref 12–17)
POC HEMOGLOBIN (CALC): 13.5 G/DL (ref 12–17)
POC HEMOGLOBIN (CALC): 14 G/DL (ref 12–17)
POC HEMOGLOBIN (CALC): 14.1 G/DL (ref 12–17)
POC HEMOGLOBIN (CALC): 14.1 G/DL (ref 12–17)
POC HEMOGLOBIN (CALC): 14.2 G/DL (ref 12–17)
POC HEMOGLOBIN (CALC): 14.5 G/DL (ref 12–17)
POC HEMOGLOBIN (CALC): 14.5 G/DL (ref 12–17)
POC O2 SATURATION: 100 % (ref 94–98)
POC O2 SATURATION: 94.5 % (ref 94–98)
POC O2 SATURATION: 99 % (ref 94–98)
POC O2 SATURATION: 99.3 % (ref 94–98)
POC O2 SATURATION: 99.3 % (ref 94–98)
POC O2 SATURATION: 99.6 % (ref 94–98)
POC O2 SATURATION: 99.8 % (ref 94–98)
POC O2 SATURATION: 99.8 % (ref 94–98)
POC O2 SATURATION: 99.9 % (ref 94–98)
POC PCO2: 27.8 MM HG (ref 35–48)
POC PCO2: 28.8 MM HG (ref 35–48)
POC PCO2: 30.9 MM HG (ref 35–48)
POC PCO2: 30.9 MM HG (ref 35–48)
POC PCO2: 31.9 MM HG (ref 35–48)
POC PCO2: 35.3 MM HG (ref 35–48)
POC PCO2: 36.9 MM HG (ref 35–48)
POC PCO2: 40.2 MM HG (ref 35–48)
POC PCO2: 40.8 MM HG (ref 35–48)
POC PCO2: 41.3 MM HG (ref 35–48)
POC PCO2: 48.2 MM HG (ref 35–48)
POC PCO2: 50.3 MM HG (ref 35–48)
POC PCO2: 56.1 MM HG (ref 35–48)
POC PCO2: 58.1 MM HG (ref 35–48)
POC PH: 7.26 (ref 7.35–7.45)
POC PH: 7.27 (ref 7.35–7.45)
POC PH: 7.34 (ref 7.35–7.45)
POC PH: 7.34 (ref 7.35–7.45)
POC PH: 7.35 (ref 7.35–7.45)
POC PH: 7.36 (ref 7.35–7.45)
POC PH: 7.39 (ref 7.35–7.45)
POC PH: 7.42 (ref 7.35–7.45)
POC PH: 7.44 (ref 7.35–7.45)
POC PH: 7.45 (ref 7.35–7.45)
POC PH: 7.45 (ref 7.35–7.45)
POC PH: 7.46 (ref 7.35–7.45)
POC PH: 7.47 (ref 7.35–7.45)
POC PH: 7.54 (ref 7.35–7.45)
POC PO2: 124.5 MM HG (ref 83–108)
POC PO2: 127.5 MM HG (ref 83–108)
POC PO2: 138.2 MM HG (ref 83–108)
POC PO2: 171.7 MM HG (ref 83–108)
POC PO2: 209.9 MM HG (ref 83–108)
POC PO2: 215.2 MM HG (ref 83–108)
POC PO2: 284.7 MM HG (ref 83–108)
POC PO2: 302.5 MM HG (ref 83–108)
POC PO2: 318.1 MM HG (ref 83–108)
POC PO2: 318.1 MM HG (ref 83–108)
POC PO2: 320.5 MM HG (ref 83–108)
POC PO2: 403.2 MM HG (ref 83–108)
POC PO2: 528.9 MM HG (ref 83–108)
POC PO2: 78.9 MM HG (ref 83–108)
POSITIVE BASE EXCESS, ART: 0.1 MMOL/L (ref 0–3)
POSITIVE BASE EXCESS, ART: 0.2 MMOL/L (ref 0–3)
POSITIVE BASE EXCESS, ART: 0.4 MMOL/L (ref 0–3)
POSITIVE BASE EXCESS, ART: 2.1 MMOL/L (ref 0–3)
POTASSIUM BLD-SCNC: 3.8 MMOL/L (ref 3.5–5.1)
POTASSIUM BLD-SCNC: 4.1 MMOL/L (ref 3.5–5.1)
POTASSIUM BLD-SCNC: 4.1 MMOL/L (ref 3.5–5.1)
POTASSIUM BLD-SCNC: 4.3 MMOL/L (ref 3.5–5.1)
POTASSIUM BLD-SCNC: 4.4 MMOL/L (ref 3.5–5.1)
POTASSIUM BLD-SCNC: 4.4 MMOL/L (ref 3.5–5.1)
POTASSIUM BLD-SCNC: 4.5 MMOL/L (ref 3.5–5.1)
POTASSIUM BLD-SCNC: 4.5 MMOL/L (ref 3.5–5.1)
POTASSIUM BLD-SCNC: 4.6 MMOL/L (ref 3.5–5.1)
POTASSIUM BLD-SCNC: 4.7 MMOL/L (ref 3.5–5.1)
POTASSIUM BLD-SCNC: 5.2 MMOL/L (ref 3.5–5.1)
POTASSIUM BLD-SCNC: 5.2 MMOL/L (ref 3.5–5.1)
POTASSIUM BLD-SCNC: 5.4 MMOL/L (ref 3.5–5.1)
POTASSIUM BLD-SCNC: 6.3 MMOL/L (ref 3.5–5.1)
POTASSIUM SERPL-SCNC: 3.9 MMOL/L (ref 3.5–5.1)
POTASSIUM SERPL-SCNC: 4.3 MMOL/L (ref 3.5–5.1)
POTASSIUM SERPL-SCNC: 4.5 MMOL/L (ref 3.5–5.1)
POTASSIUM SERPL-SCNC: 5.2 MMOL/L (ref 3.5–5.1)
PROTHROMBIN TIME: 14.2 SEC (ref 11.7–14.5)
PROTHROMBIN TIME: 16.8 SEC (ref 11.7–14.5)
RBC # BLD AUTO: 4.12 M/UL (ref 4.6–6.2)
RBC # BLD AUTO: 4.52 M/UL (ref 4.6–6.2)
SODIUM BLD-SCNC: 140 MMOL/L (ref 136–145)
SODIUM BLD-SCNC: 141 MMOL/L (ref 136–145)
SODIUM BLD-SCNC: 141 MMOL/L (ref 136–145)
SODIUM BLD-SCNC: 142 MMOL/L (ref 136–145)
SODIUM BLD-SCNC: 143 MMOL/L (ref 136–145)
SODIUM BLD-SCNC: 143 MMOL/L (ref 136–145)
SODIUM BLD-SCNC: 144 MMOL/L (ref 136–145)
SODIUM BLD-SCNC: 145 MMOL/L (ref 136–145)
SODIUM BLD-SCNC: 145 MMOL/L (ref 136–145)
SODIUM BLD-SCNC: 146 MMOL/L (ref 136–145)
SODIUM BLD-SCNC: 147 MMOL/L (ref 136–145)
SODIUM BLD-SCNC: 147 MMOL/L (ref 136–145)
SODIUM SERPL-SCNC: 140 MMOL/L (ref 136–145)
SODIUM SERPL-SCNC: 141 MMOL/L (ref 136–145)
SODIUM SERPL-SCNC: 144 MMOL/L (ref 136–145)
TCO2 (CALC), ART: 21 MMOL/L (ref 21–32)
TCO2 (CALC), ART: 23 MMOL/L (ref 21–32)
TCO2 (CALC), ART: 24 MMOL/L (ref 21–32)
TCO2 (CALC), ART: 24 MMOL/L (ref 21–32)
TCO2 (CALC), ART: 25 MMOL/L (ref 21–32)
TCO2 (CALC), ART: 26 MMOL/L (ref 21–32)
TCO2 (CALC), ART: 27 MMOL/L (ref 21–32)
TCO2 (CALC), ART: 28 MMOL/L (ref 21–32)
TCO2 (CALC), ART: 28 MMOL/L (ref 21–32)
TCO2 (CALC), ART: 29 MMOL/L (ref 21–32)
WBC OTHER # BLD: 11 K/UL (ref 4–10.5)
WBC OTHER # BLD: 15.6 K/UL (ref 4–10.5)

## 2025-02-05 PROCEDURE — 3700000001 HC ADD 15 MINUTES (ANESTHESIA): Performed by: THORACIC SURGERY (CARDIOTHORACIC VASCULAR SURGERY)

## 2025-02-05 PROCEDURE — 71045 X-RAY EXAM CHEST 1 VIEW: CPT

## 2025-02-05 PROCEDURE — 6370000000 HC RX 637 (ALT 250 FOR IP): Performed by: PHYSICIAN ASSISTANT

## 2025-02-05 PROCEDURE — 2580000003 HC RX 258: Performed by: PHYSICIAN ASSISTANT

## 2025-02-05 PROCEDURE — 2580000003 HC RX 258: Performed by: THORACIC SURGERY (CARDIOTHORACIC VASCULAR SURGERY)

## 2025-02-05 PROCEDURE — 06BQ4ZZ EXCISION OF LEFT SAPHENOUS VEIN, PERCUTANEOUS ENDOSCOPIC APPROACH: ICD-10-PCS | Performed by: THORACIC SURGERY (CARDIOTHORACIC VASCULAR SURGERY)

## 2025-02-05 PROCEDURE — 2500000003 HC RX 250 WO HCPCS: Performed by: THORACIC SURGERY (CARDIOTHORACIC VASCULAR SURGERY)

## 2025-02-05 PROCEDURE — 85730 THROMBOPLASTIN TIME PARTIAL: CPT

## 2025-02-05 PROCEDURE — 85347 COAGULATION TIME ACTIVATED: CPT

## 2025-02-05 PROCEDURE — 6360000002 HC RX W HCPCS: Performed by: PHYSICIAN ASSISTANT

## 2025-02-05 PROCEDURE — C1729 CATH, DRAINAGE: HCPCS | Performed by: THORACIC SURGERY (CARDIOTHORACIC VASCULAR SURGERY)

## 2025-02-05 PROCEDURE — P9047 ALBUMIN (HUMAN), 25%, 50ML: HCPCS

## 2025-02-05 PROCEDURE — 94002 VENT MGMT INPAT INIT DAY: CPT

## 2025-02-05 PROCEDURE — 2580000003 HC RX 258: Performed by: NURSE ANESTHETIST, CERTIFIED REGISTERED

## 2025-02-05 PROCEDURE — 02100Z9 BYPASS CORONARY ARTERY, ONE ARTERY FROM LEFT INTERNAL MAMMARY, OPEN APPROACH: ICD-10-PCS | Performed by: THORACIC SURGERY (CARDIOTHORACIC VASCULAR SURGERY)

## 2025-02-05 PROCEDURE — 6360000002 HC RX W HCPCS: Performed by: THORACIC SURGERY (CARDIOTHORACIC VASCULAR SURGERY)

## 2025-02-05 PROCEDURE — 85014 HEMATOCRIT: CPT

## 2025-02-05 PROCEDURE — 6370000000 HC RX 637 (ALT 250 FOR IP): Performed by: THORACIC SURGERY (CARDIOTHORACIC VASCULAR SURGERY)

## 2025-02-05 PROCEDURE — 80047 BASIC METABLC PNL IONIZED CA: CPT

## 2025-02-05 PROCEDURE — B24BZZ4 ULTRASONOGRAPHY OF HEART WITH AORTA, TRANSESOPHAGEAL: ICD-10-PCS | Performed by: THORACIC SURGERY (CARDIOTHORACIC VASCULAR SURGERY)

## 2025-02-05 PROCEDURE — 85610 PROTHROMBIN TIME: CPT

## 2025-02-05 PROCEDURE — 99291 CRITICAL CARE FIRST HOUR: CPT | Performed by: INTERNAL MEDICINE

## 2025-02-05 PROCEDURE — C1769 GUIDE WIRE: HCPCS | Performed by: THORACIC SURGERY (CARDIOTHORACIC VASCULAR SURGERY)

## 2025-02-05 PROCEDURE — 85384 FIBRINOGEN ACTIVITY: CPT

## 2025-02-05 PROCEDURE — 2500000003 HC RX 250 WO HCPCS: Performed by: PHYSICIAN ASSISTANT

## 2025-02-05 PROCEDURE — 80048 BASIC METABOLIC PNL TOTAL CA: CPT

## 2025-02-05 PROCEDURE — 3600000018 HC SURGERY OHS ADDTL 15MIN: Performed by: THORACIC SURGERY (CARDIOTHORACIC VASCULAR SURGERY)

## 2025-02-05 PROCEDURE — A4648 IMPLANTABLE TISSUE MARKER: HCPCS | Performed by: THORACIC SURGERY (CARDIOTHORACIC VASCULAR SURGERY)

## 2025-02-05 PROCEDURE — 6360000002 HC RX W HCPCS

## 2025-02-05 PROCEDURE — 84132 ASSAY OF SERUM POTASSIUM: CPT

## 2025-02-05 PROCEDURE — 3700000000 HC ANESTHESIA ATTENDED CARE: Performed by: THORACIC SURGERY (CARDIOTHORACIC VASCULAR SURGERY)

## 2025-02-05 PROCEDURE — 6360000002 HC RX W HCPCS: Performed by: NURSE ANESTHETIST, CERTIFIED REGISTERED

## 2025-02-05 PROCEDURE — 31500 INSERT EMERGENCY AIRWAY: CPT

## 2025-02-05 PROCEDURE — 2500000003 HC RX 250 WO HCPCS: Performed by: NURSE ANESTHETIST, CERTIFIED REGISTERED

## 2025-02-05 PROCEDURE — 6370000000 HC RX 637 (ALT 250 FOR IP): Performed by: ANESTHESIOLOGY

## 2025-02-05 PROCEDURE — C1768 GRAFT, VASCULAR: HCPCS | Performed by: THORACIC SURGERY (CARDIOTHORACIC VASCULAR SURGERY)

## 2025-02-05 PROCEDURE — 83735 ASSAY OF MAGNESIUM: CPT

## 2025-02-05 PROCEDURE — 5A1221Z PERFORMANCE OF CARDIAC OUTPUT, CONTINUOUS: ICD-10-PCS | Performed by: THORACIC SURGERY (CARDIOTHORACIC VASCULAR SURGERY)

## 2025-02-05 PROCEDURE — 82330 ASSAY OF CALCIUM: CPT

## 2025-02-05 PROCEDURE — 82803 BLOOD GASES ANY COMBINATION: CPT

## 2025-02-05 PROCEDURE — C1751 CATH, INF, PER/CENT/MIDLINE: HCPCS | Performed by: THORACIC SURGERY (CARDIOTHORACIC VASCULAR SURGERY)

## 2025-02-05 PROCEDURE — 3600000008 HC SURGERY OHS BASE: Performed by: THORACIC SURGERY (CARDIOTHORACIC VASCULAR SURGERY)

## 2025-02-05 PROCEDURE — P9045 ALBUMIN (HUMAN), 5%, 250 ML: HCPCS | Performed by: ANESTHESIOLOGY

## 2025-02-05 PROCEDURE — 2720000010 HC SURG SUPPLY STERILE: Performed by: THORACIC SURGERY (CARDIOTHORACIC VASCULAR SURGERY)

## 2025-02-05 PROCEDURE — 2500000003 HC RX 250 WO HCPCS

## 2025-02-05 PROCEDURE — 82962 GLUCOSE BLOOD TEST: CPT

## 2025-02-05 PROCEDURE — 85027 COMPLETE CBC AUTOMATED: CPT

## 2025-02-05 PROCEDURE — 94640 AIRWAY INHALATION TREATMENT: CPT

## 2025-02-05 PROCEDURE — P9045 ALBUMIN (HUMAN), 5%, 250 ML: HCPCS | Performed by: THORACIC SURGERY (CARDIOTHORACIC VASCULAR SURGERY)

## 2025-02-05 PROCEDURE — 2709999900 HC NON-CHARGEABLE SUPPLY: Performed by: THORACIC SURGERY (CARDIOTHORACIC VASCULAR SURGERY)

## 2025-02-05 PROCEDURE — 2100000000 HC CCU R&B

## 2025-02-05 PROCEDURE — 021009W BYPASS CORONARY ARTERY, ONE ARTERY FROM AORTA WITH AUTOLOGOUS VENOUS TISSUE, OPEN APPROACH: ICD-10-PCS | Performed by: THORACIC SURGERY (CARDIOTHORACIC VASCULAR SURGERY)

## 2025-02-05 PROCEDURE — 6360000002 HC RX W HCPCS: Performed by: ANESTHESIOLOGY

## 2025-02-05 RX ORDER — LIDOCAINE HYDROCHLORIDE 20 MG/ML
INJECTION, SOLUTION INTRAVENOUS
Status: DISCONTINUED | OUTPATIENT
Start: 2025-02-05 | End: 2025-02-05 | Stop reason: SDUPTHER

## 2025-02-05 RX ORDER — HEPARIN SODIUM 1000 [USP'U]/ML
INJECTION, SOLUTION INTRAVENOUS; SUBCUTANEOUS
Status: DISCONTINUED | OUTPATIENT
Start: 2025-02-05 | End: 2025-02-05 | Stop reason: SDUPTHER

## 2025-02-05 RX ORDER — FENTANYL CITRATE 0.05 MG/ML
INJECTION, SOLUTION INTRAMUSCULAR; INTRAVENOUS
Status: DISCONTINUED | OUTPATIENT
Start: 2025-02-05 | End: 2025-02-05 | Stop reason: SDUPTHER

## 2025-02-05 RX ORDER — LATANOPROST 50 UG/ML
1 SOLUTION/ DROPS OPHTHALMIC NIGHTLY
Status: DISCONTINUED | OUTPATIENT
Start: 2025-02-05 | End: 2025-02-09 | Stop reason: HOSPADM

## 2025-02-05 RX ORDER — DEXMEDETOMIDINE HYDROCHLORIDE 4 UG/ML
.1-1.5 INJECTION, SOLUTION INTRAVENOUS CONTINUOUS
Status: DISCONTINUED | OUTPATIENT
Start: 2025-02-05 | End: 2025-02-06 | Stop reason: ALTCHOICE

## 2025-02-05 RX ORDER — GINSENG 100 MG
CAPSULE ORAL 2 TIMES DAILY
Status: DISCONTINUED | OUTPATIENT
Start: 2025-02-07 | End: 2025-02-09 | Stop reason: HOSPADM

## 2025-02-05 RX ORDER — MUPIROCIN 20 MG/G
OINTMENT TOPICAL 2 TIMES DAILY
Status: DISCONTINUED | OUTPATIENT
Start: 2025-02-05 | End: 2025-02-09 | Stop reason: HOSPADM

## 2025-02-05 RX ORDER — FAMOTIDINE 20 MG/1
20 TABLET, FILM COATED ORAL 2 TIMES DAILY
Status: DISCONTINUED | OUTPATIENT
Start: 2025-02-05 | End: 2025-02-09 | Stop reason: HOSPADM

## 2025-02-05 RX ORDER — ONDANSETRON 4 MG/1
4 TABLET, ORALLY DISINTEGRATING ORAL EVERY 8 HOURS PRN
Status: DISCONTINUED | OUTPATIENT
Start: 2025-02-05 | End: 2025-02-09 | Stop reason: HOSPADM

## 2025-02-05 RX ORDER — SODIUM CHLORIDE 0.9 % (FLUSH) 0.9 %
5-40 SYRINGE (ML) INJECTION PRN
Status: DISCONTINUED | OUTPATIENT
Start: 2025-02-05 | End: 2025-02-09 | Stop reason: HOSPADM

## 2025-02-05 RX ORDER — SODIUM CHLORIDE 9 MG/ML
INJECTION, SOLUTION INTRAVENOUS
Status: DISCONTINUED | OUTPATIENT
Start: 2025-02-05 | End: 2025-02-05 | Stop reason: SDUPTHER

## 2025-02-05 RX ORDER — SODIUM CHLORIDE 9 MG/ML
INJECTION, SOLUTION INTRAVENOUS PRN
Status: DISCONTINUED | OUTPATIENT
Start: 2025-02-05 | End: 2025-02-05 | Stop reason: HOSPADM

## 2025-02-05 RX ORDER — PROPOFOL 10 MG/ML
5-50 INJECTION, EMULSION INTRAVENOUS CONTINUOUS
Status: DISCONTINUED | OUTPATIENT
Start: 2025-02-05 | End: 2025-02-06 | Stop reason: ALTCHOICE

## 2025-02-05 RX ORDER — FENTANYL CITRATE 50 UG/ML
25 INJECTION, SOLUTION INTRAMUSCULAR; INTRAVENOUS
Status: DISCONTINUED | OUTPATIENT
Start: 2025-02-05 | End: 2025-02-06 | Stop reason: ALTCHOICE

## 2025-02-05 RX ORDER — POTASSIUM CHLORIDE 7.45 MG/ML
10 INJECTION INTRAVENOUS PRN
Status: DISCONTINUED | OUTPATIENT
Start: 2025-02-05 | End: 2025-02-09 | Stop reason: HOSPADM

## 2025-02-05 RX ORDER — ALBUMIN HUMAN 50 G/1000ML
25 SOLUTION INTRAVENOUS ONCE
Status: COMPLETED | OUTPATIENT
Start: 2025-02-05 | End: 2025-02-05

## 2025-02-05 RX ORDER — DEXMEDETOMIDINE HYDROCHLORIDE 4 UG/ML
.1-1.5 INJECTION, SOLUTION INTRAVENOUS ONCE
Status: COMPLETED | OUTPATIENT
Start: 2025-02-05 | End: 2025-02-05

## 2025-02-05 RX ORDER — SODIUM CHLORIDE 9 MG/ML
INJECTION, SOLUTION INTRAVENOUS
Status: DISCONTINUED | OUTPATIENT
Start: 2025-02-05 | End: 2025-02-05

## 2025-02-05 RX ORDER — MAGNESIUM SULFATE IN WATER 40 MG/ML
2000 INJECTION, SOLUTION INTRAVENOUS PRN
Status: DISCONTINUED | OUTPATIENT
Start: 2025-02-05 | End: 2025-02-09 | Stop reason: HOSPADM

## 2025-02-05 RX ORDER — SODIUM CHLORIDE 9 MG/ML
INJECTION, SOLUTION INTRAVENOUS PRN
Status: DISCONTINUED | OUTPATIENT
Start: 2025-02-05 | End: 2025-02-09 | Stop reason: HOSPADM

## 2025-02-05 RX ORDER — CHLORHEXIDINE GLUCONATE ORAL RINSE 1.2 MG/ML
15 SOLUTION DENTAL ONCE
Status: COMPLETED | OUTPATIENT
Start: 2025-02-05 | End: 2025-02-05

## 2025-02-05 RX ORDER — HEPARIN SODIUM 5000 [USP'U]/ML
5000 INJECTION, SOLUTION INTRAVENOUS; SUBCUTANEOUS EVERY 8 HOURS SCHEDULED
Status: DISCONTINUED | OUTPATIENT
Start: 2025-02-06 | End: 2025-02-09 | Stop reason: HOSPADM

## 2025-02-05 RX ORDER — PRAVASTATIN SODIUM 20 MG
10 TABLET ORAL NIGHTLY
Status: DISCONTINUED | OUTPATIENT
Start: 2025-02-05 | End: 2025-02-09 | Stop reason: HOSPADM

## 2025-02-05 RX ORDER — POTASSIUM CHLORIDE 29.8 MG/ML
20 INJECTION INTRAVENOUS PRN
Status: DISCONTINUED | OUTPATIENT
Start: 2025-02-05 | End: 2025-02-09 | Stop reason: HOSPADM

## 2025-02-05 RX ORDER — POLYETHYLENE GLYCOL 3350 17 G/17G
17 POWDER, FOR SOLUTION ORAL DAILY
Status: DISCONTINUED | OUTPATIENT
Start: 2025-02-05 | End: 2025-02-09 | Stop reason: HOSPADM

## 2025-02-05 RX ORDER — SODIUM CHLORIDE 0.9 % (FLUSH) 0.9 %
5-40 SYRINGE (ML) INJECTION PRN
Status: DISCONTINUED | OUTPATIENT
Start: 2025-02-05 | End: 2025-02-05 | Stop reason: HOSPADM

## 2025-02-05 RX ORDER — ONDANSETRON 2 MG/ML
4 INJECTION INTRAMUSCULAR; INTRAVENOUS EVERY 6 HOURS PRN
Status: DISCONTINUED | OUTPATIENT
Start: 2025-02-05 | End: 2025-02-09 | Stop reason: HOSPADM

## 2025-02-05 RX ORDER — MIDAZOLAM HYDROCHLORIDE 1 MG/ML
INJECTION, SOLUTION INTRAMUSCULAR; INTRAVENOUS
Status: DISCONTINUED | OUTPATIENT
Start: 2025-02-05 | End: 2025-02-05 | Stop reason: SDUPTHER

## 2025-02-05 RX ORDER — PROPOFOL 10 MG/ML
INJECTION, EMULSION INTRAVENOUS
Status: DISCONTINUED | OUTPATIENT
Start: 2025-02-05 | End: 2025-02-05 | Stop reason: SDUPTHER

## 2025-02-05 RX ORDER — BISACODYL 10 MG
10 SUPPOSITORY, RECTAL RECTAL DAILY PRN
Status: DISCONTINUED | OUTPATIENT
Start: 2025-02-05 | End: 2025-02-09 | Stop reason: HOSPADM

## 2025-02-05 RX ORDER — FAMOTIDINE 10 MG/ML
20 INJECTION, SOLUTION INTRAVENOUS 2 TIMES DAILY
Status: DISCONTINUED | OUTPATIENT
Start: 2025-02-05 | End: 2025-02-09 | Stop reason: HOSPADM

## 2025-02-05 RX ORDER — ROCURONIUM BROMIDE 10 MG/ML
INJECTION, SOLUTION INTRAVENOUS
Status: DISCONTINUED | OUTPATIENT
Start: 2025-02-05 | End: 2025-02-05 | Stop reason: SDUPTHER

## 2025-02-05 RX ORDER — PROTAMINE SULFATE 10 MG/ML
50 INJECTION, SOLUTION INTRAVENOUS
Status: ACTIVE | OUTPATIENT
Start: 2025-02-05 | End: 2025-02-06

## 2025-02-05 RX ORDER — SODIUM CHLORIDE 0.9 % (FLUSH) 0.9 %
5-40 SYRINGE (ML) INJECTION EVERY 12 HOURS SCHEDULED
Status: DISCONTINUED | OUTPATIENT
Start: 2025-02-05 | End: 2025-02-05 | Stop reason: HOSPADM

## 2025-02-05 RX ORDER — SENNA AND DOCUSATE SODIUM 50; 8.6 MG/1; MG/1
1 TABLET, FILM COATED ORAL 2 TIMES DAILY
Status: DISCONTINUED | OUTPATIENT
Start: 2025-02-05 | End: 2025-02-09 | Stop reason: HOSPADM

## 2025-02-05 RX ORDER — TRAMADOL HYDROCHLORIDE 50 MG/1
50 TABLET ORAL EVERY 6 HOURS PRN
Status: DISCONTINUED | OUTPATIENT
Start: 2025-02-05 | End: 2025-02-09 | Stop reason: HOSPADM

## 2025-02-05 RX ORDER — M-VIT,TX,IRON,MINS/CALC/FOLIC 27MG-0.4MG
1 TABLET ORAL
Status: DISCONTINUED | OUTPATIENT
Start: 2025-02-06 | End: 2025-02-09 | Stop reason: HOSPADM

## 2025-02-05 RX ORDER — CLOPIDOGREL BISULFATE 75 MG/1
75 TABLET ORAL DAILY
Status: DISCONTINUED | OUTPATIENT
Start: 2025-02-07 | End: 2025-02-09 | Stop reason: HOSPADM

## 2025-02-05 RX ORDER — ACETAMINOPHEN 500 MG
1000 TABLET ORAL EVERY 6 HOURS SCHEDULED
Status: DISCONTINUED | OUTPATIENT
Start: 2025-02-05 | End: 2025-02-09 | Stop reason: HOSPADM

## 2025-02-05 RX ORDER — ALBUMIN HUMAN 50 G/1000ML
SOLUTION INTRAVENOUS
Status: DISCONTINUED | OUTPATIENT
Start: 2025-02-05 | End: 2025-02-05 | Stop reason: SDUPTHER

## 2025-02-05 RX ORDER — FENTANYL CITRATE 50 UG/ML
25 INJECTION, SOLUTION INTRAMUSCULAR; INTRAVENOUS
Status: ACTIVE | OUTPATIENT
Start: 2025-02-05 | End: 2025-02-06

## 2025-02-05 RX ORDER — CALCIUM CHLORIDE 100 MG/ML
INJECTION INTRAVENOUS; INTRAVENTRICULAR
Status: DISCONTINUED | OUTPATIENT
Start: 2025-02-05 | End: 2025-02-05 | Stop reason: SDUPTHER

## 2025-02-05 RX ORDER — ALBUMIN HUMAN 50 G/1000ML
12.5 SOLUTION INTRAVENOUS PRN
Status: DISCONTINUED | OUTPATIENT
Start: 2025-02-05 | End: 2025-02-09 | Stop reason: HOSPADM

## 2025-02-05 RX ORDER — PHENYLEPHRINE HCL IN 0.9% NACL 1 MG/10 ML
SYRINGE (ML) INTRAVENOUS
Status: DISCONTINUED | OUTPATIENT
Start: 2025-02-05 | End: 2025-02-05 | Stop reason: SDUPTHER

## 2025-02-05 RX ORDER — CHLORHEXIDINE GLUCONATE 40 MG/ML
SOLUTION TOPICAL ONCE
Status: COMPLETED | OUTPATIENT
Start: 2025-02-05 | End: 2025-02-05

## 2025-02-05 RX ORDER — SODIUM CHLORIDE 0.9 % (FLUSH) 0.9 %
5-40 SYRINGE (ML) INJECTION EVERY 12 HOURS SCHEDULED
Status: DISCONTINUED | OUTPATIENT
Start: 2025-02-05 | End: 2025-02-09 | Stop reason: HOSPADM

## 2025-02-05 RX ORDER — METHOCARBAMOL 500 MG/1
500 TABLET, FILM COATED ORAL 3 TIMES DAILY
Status: DISCONTINUED | OUTPATIENT
Start: 2025-02-05 | End: 2025-02-09 | Stop reason: HOSPADM

## 2025-02-05 RX ORDER — IPRATROPIUM BROMIDE AND ALBUTEROL SULFATE 2.5; .5 MG/3ML; MG/3ML
1 SOLUTION RESPIRATORY (INHALATION)
Status: DISCONTINUED | OUTPATIENT
Start: 2025-02-05 | End: 2025-02-08

## 2025-02-05 RX ORDER — ASPIRIN 81 MG/1
81 TABLET, CHEWABLE ORAL DAILY
Status: DISCONTINUED | OUTPATIENT
Start: 2025-02-06 | End: 2025-02-09 | Stop reason: HOSPADM

## 2025-02-05 RX ORDER — MUPIROCIN 20 MG/G
OINTMENT TOPICAL ONCE
Status: COMPLETED | OUTPATIENT
Start: 2025-02-05 | End: 2025-02-05

## 2025-02-05 RX ORDER — ACETAMINOPHEN 500 MG
1000 TABLET ORAL ONCE
Status: COMPLETED | OUTPATIENT
Start: 2025-02-05 | End: 2025-02-05

## 2025-02-05 RX ORDER — DEXTROSE MONOHYDRATE 100 MG/ML
INJECTION, SOLUTION INTRAVENOUS CONTINUOUS PRN
Status: DISCONTINUED | OUTPATIENT
Start: 2025-02-05 | End: 2025-02-09 | Stop reason: HOSPADM

## 2025-02-05 RX ORDER — PROTAMINE SULFATE 10 MG/ML
INJECTION, SOLUTION INTRAVENOUS
Status: DISCONTINUED | OUTPATIENT
Start: 2025-02-05 | End: 2025-02-05 | Stop reason: SDUPTHER

## 2025-02-05 RX ORDER — HYDRALAZINE HYDROCHLORIDE 20 MG/ML
20 INJECTION INTRAMUSCULAR; INTRAVENOUS EVERY 6 HOURS PRN
Status: DISCONTINUED | OUTPATIENT
Start: 2025-02-08 | End: 2025-02-09 | Stop reason: HOSPADM

## 2025-02-05 RX ORDER — CHLORHEXIDINE GLUCONATE ORAL RINSE 1.2 MG/ML
15 SOLUTION DENTAL 2 TIMES DAILY
Status: DISCONTINUED | OUTPATIENT
Start: 2025-02-05 | End: 2025-02-09 | Stop reason: HOSPADM

## 2025-02-05 RX ORDER — GABAPENTIN 300 MG/1
300 CAPSULE ORAL 3 TIMES DAILY
Status: DISCONTINUED | OUTPATIENT
Start: 2025-02-05 | End: 2025-02-09 | Stop reason: HOSPADM

## 2025-02-05 RX ORDER — SODIUM CHLORIDE 9 MG/ML
INJECTION, SOLUTION INTRAVENOUS CONTINUOUS
Status: DISCONTINUED | OUTPATIENT
Start: 2025-02-05 | End: 2025-02-07

## 2025-02-05 RX ORDER — POTASSIUM CHLORIDE 1500 MG/1
40 TABLET, EXTENDED RELEASE ORAL PRN
Status: DISCONTINUED | OUTPATIENT
Start: 2025-02-05 | End: 2025-02-09 | Stop reason: HOSPADM

## 2025-02-05 RX ORDER — GLUCAGON 1 MG/ML
1 KIT INJECTION PRN
Status: DISCONTINUED | OUTPATIENT
Start: 2025-02-05 | End: 2025-02-09 | Stop reason: HOSPADM

## 2025-02-05 RX ADMIN — SODIUM CHLORIDE: 9 INJECTION, SOLUTION INTRAVENOUS at 10:03

## 2025-02-05 RX ADMIN — CHLORHEXIDINE GLUCONATE 0.12% ORAL RINSE 15 ML: 1.2 LIQUID ORAL at 20:44

## 2025-02-05 RX ADMIN — SENNOSIDES AND DOCUSATE SODIUM 1 TABLET: 50; 8.6 TABLET ORAL at 20:44

## 2025-02-05 RX ADMIN — ACETAMINOPHEN 1000 MG: 500 TABLET ORAL at 08:42

## 2025-02-05 RX ADMIN — SODIUM BICARBONATE 50 MEQ: 84 INJECTION, SOLUTION INTRAVENOUS at 12:50

## 2025-02-05 RX ADMIN — PROPOFOL 35 MCG/KG/MIN: 10 INJECTION, EMULSION INTRAVENOUS at 12:58

## 2025-02-05 RX ADMIN — SODIUM BICARBONATE 50 MEQ: 84 INJECTION, SOLUTION INTRAVENOUS at 15:16

## 2025-02-05 RX ADMIN — PROPOFOL 50 MG: 10 INJECTION, EMULSION INTRAVENOUS at 12:05

## 2025-02-05 RX ADMIN — FENTANYL CITRATE 25 MCG: 50 INJECTION, SOLUTION INTRAMUSCULAR; INTRAVENOUS at 20:07

## 2025-02-05 RX ADMIN — POTASSIUM CHLORIDE 20 MEQ: 29.8 INJECTION, SOLUTION INTRAVENOUS at 18:30

## 2025-02-05 RX ADMIN — WATER 2000 MG: 1 INJECTION INTRAMUSCULAR; INTRAVENOUS; SUBCUTANEOUS at 20:44

## 2025-02-05 RX ADMIN — FAMOTIDINE 20 MG: 10 INJECTION, SOLUTION INTRAVENOUS at 15:21

## 2025-02-05 RX ADMIN — MUPIROCIN: 20 OINTMENT TOPICAL at 20:55

## 2025-02-05 RX ADMIN — PROPOFOL 50 MG: 10 INJECTION, EMULSION INTRAVENOUS at 12:07

## 2025-02-05 RX ADMIN — Medication 0.05 MG: at 11:04

## 2025-02-05 RX ADMIN — PHENYLEPHRINE HYDROCHLORIDE 5 MCG/MIN: 10 INJECTION INTRAVENOUS at 12:20

## 2025-02-05 RX ADMIN — FENTANYL CITRATE 150 MCG: 50 INJECTION, SOLUTION INTRAMUSCULAR; INTRAVENOUS at 11:10

## 2025-02-05 RX ADMIN — SODIUM CHLORIDE: 9 INJECTION, SOLUTION INTRAVENOUS at 14:15

## 2025-02-05 RX ADMIN — MUPIROCIN: 20 OINTMENT TOPICAL at 08:42

## 2025-02-05 RX ADMIN — FENTANYL CITRATE 350 MCG: 50 INJECTION, SOLUTION INTRAMUSCULAR; INTRAVENOUS at 10:33

## 2025-02-05 RX ADMIN — FENTANYL CITRATE 200 MCG: 50 INJECTION, SOLUTION INTRAMUSCULAR; INTRAVENOUS at 12:16

## 2025-02-05 RX ADMIN — PROTAMINE SULFATE 300 MG: 10 INJECTION, SOLUTION INTRAVENOUS at 12:04

## 2025-02-05 RX ADMIN — SODIUM CHLORIDE, PRESERVATIVE FREE 10 ML: 5 INJECTION INTRAVENOUS at 20:56

## 2025-02-05 RX ADMIN — IPRATROPIUM BROMIDE AND ALBUTEROL SULFATE 1 DOSE: .5; 2.5 SOLUTION RESPIRATORY (INHALATION) at 19:32

## 2025-02-05 RX ADMIN — SUGAMMADEX 200 MG: 100 INJECTION, SOLUTION INTRAVENOUS at 13:08

## 2025-02-05 RX ADMIN — ROCURONIUM BROMIDE 50 MG: 10 INJECTION, SOLUTION INTRAVENOUS at 11:01

## 2025-02-05 RX ADMIN — MIDAZOLAM 2 MG: 1 INJECTION INTRAMUSCULAR; INTRAVENOUS at 09:55

## 2025-02-05 RX ADMIN — ROCURONIUM BROMIDE 100 MG: 10 INJECTION, SOLUTION INTRAVENOUS at 10:04

## 2025-02-05 RX ADMIN — WATER 2000 MG: 1 INJECTION INTRAMUSCULAR; INTRAVENOUS; SUBCUTANEOUS at 15:21

## 2025-02-05 RX ADMIN — SODIUM CHLORIDE 2 UNITS/HR: 9 INJECTION, SOLUTION INTRAVENOUS at 10:42

## 2025-02-05 RX ADMIN — LIDOCAINE HYDROCHLORIDE 80 MG: 20 INJECTION, SOLUTION INTRAVENOUS at 10:03

## 2025-02-05 RX ADMIN — HEPARIN SODIUM 35000 UNITS: 1000 INJECTION INTRAVENOUS; SUBCUTANEOUS at 11:04

## 2025-02-05 RX ADMIN — FENTANYL CITRATE 25 MCG: 50 INJECTION, SOLUTION INTRAMUSCULAR; INTRAVENOUS at 15:14

## 2025-02-05 RX ADMIN — LATANOPROST 1 DROP: 50 SOLUTION OPHTHALMIC at 20:52

## 2025-02-05 RX ADMIN — CALCIUM CHLORIDE 0.5 G: 100 INJECTION INTRAVENOUS; INTRAVENTRICULAR at 12:28

## 2025-02-05 RX ADMIN — PRAVASTATIN SODIUM 10 MG: 20 TABLET ORAL at 20:51

## 2025-02-05 RX ADMIN — AMINOCAPROIC ACID 5 G/HR: 250 INJECTION, SOLUTION INTRAVENOUS at 10:20

## 2025-02-05 RX ADMIN — CHLORHEXIDINE GLUCONATE 0.12% ORAL RINSE 15 ML: 1.2 LIQUID ORAL at 16:29

## 2025-02-05 RX ADMIN — METHOCARBAMOL 500 MG: 500 TABLET ORAL at 20:47

## 2025-02-05 RX ADMIN — ALBUMIN (HUMAN) 250 ML: 12.5 INJECTION, SOLUTION INTRAVENOUS at 12:25

## 2025-02-05 RX ADMIN — ALBUMIN (HUMAN) 250 ML: 12.5 INJECTION, SOLUTION INTRAVENOUS at 12:12

## 2025-02-05 RX ADMIN — PROPOFOL 150 MG: 10 INJECTION, EMULSION INTRAVENOUS at 10:03

## 2025-02-05 RX ADMIN — FAMOTIDINE 20 MG: 10 INJECTION, SOLUTION INTRAVENOUS at 20:44

## 2025-02-05 RX ADMIN — CHLORHEXIDINE GLUCONATE 118 ML: 4 SOLUTION TOPICAL at 08:43

## 2025-02-05 RX ADMIN — FENTANYL CITRATE 150 MCG: 50 INJECTION, SOLUTION INTRAMUSCULAR; INTRAVENOUS at 12:10

## 2025-02-05 RX ADMIN — PHENYLEPHRINE HYDROCHLORIDE 10 MCG/MIN: 10 INJECTION INTRAVENOUS at 16:15

## 2025-02-05 RX ADMIN — FENTANYL CITRATE 150 MCG: 50 INJECTION, SOLUTION INTRAMUSCULAR; INTRAVENOUS at 10:03

## 2025-02-05 RX ADMIN — PROPOFOL 50 MG: 10 INJECTION, EMULSION INTRAVENOUS at 12:04

## 2025-02-05 RX ADMIN — GABAPENTIN 300 MG: 300 CAPSULE ORAL at 20:44

## 2025-02-05 RX ADMIN — HEPARIN SODIUM: 1000 INJECTION INTRAVENOUS; SUBCUTANEOUS at 10:26

## 2025-02-05 RX ADMIN — POTASSIUM CHLORIDE 20 MEQ: 29.8 INJECTION, SOLUTION INTRAVENOUS at 17:29

## 2025-02-05 RX ADMIN — MUPIROCIN: 20 OINTMENT TOPICAL at 15:21

## 2025-02-05 RX ADMIN — DEXMEDETOMIDINE HYDROCHLORIDE 0.5 MCG/KG/HR: 4 INJECTION, SOLUTION INTRAVENOUS at 10:34

## 2025-02-05 RX ADMIN — ACETAMINOPHEN 1000 MG: 500 TABLET ORAL at 19:22

## 2025-02-05 RX ADMIN — ALBUMIN (HUMAN) 25 G: 12.5 INJECTION, SOLUTION INTRAVENOUS at 15:16

## 2025-02-05 RX ADMIN — SODIUM BICARBONATE 50 MEQ: 84 INJECTION, SOLUTION INTRAVENOUS at 18:42

## 2025-02-05 RX ADMIN — CHLORHEXIDINE GLUCONATE 0.12% ORAL RINSE 15 ML: 1.2 LIQUID ORAL at 08:43

## 2025-02-05 RX ADMIN — CEFAZOLIN 2000 MG: 2 INJECTION, POWDER, FOR SOLUTION INTRAMUSCULAR; INTRAVENOUS at 10:25

## 2025-02-05 ASSESSMENT — PAIN SCALES - GENERAL
PAINLEVEL_OUTOF10: 0

## 2025-02-05 ASSESSMENT — PULMONARY FUNCTION TESTS
PIF_VALUE: 19
PIF_VALUE: 20
PIF_VALUE: 23
PIF_VALUE: 22
PIF_VALUE: 25
PIF_VALUE: 19
PIF_VALUE: 23
PIF_VALUE: 25
PIF_VALUE: 20
PIF_VALUE: 19
PIF_VALUE: 24
PIF_VALUE: 21
PIF_VALUE: 21
PIF_VALUE: 25
PIF_VALUE: 24
PIF_VALUE: 21
PIF_VALUE: 19
PIF_VALUE: 20
PIF_VALUE: 20
PIF_VALUE: 21
PIF_VALUE: 21
PIF_VALUE: 24
PIF_VALUE: 24
PIF_VALUE: 20
PIF_VALUE: 19
PIF_VALUE: 26
PIF_VALUE: 21
PIF_VALUE: 28
PIF_VALUE: 19
PIF_VALUE: 22
PIF_VALUE: 19
PIF_VALUE: 24
PIF_VALUE: 23

## 2025-02-05 ASSESSMENT — PAIN SCALES - WONG BAKER: WONGBAKER_NUMERICALRESPONSE: NO HURT

## 2025-02-05 NOTE — BRIEF OP NOTE
Brief Postoperative Note      Patient: Kian Frias  YOB: 1960  MRN: [id number]    Date of Procedure: 2/5/2025    Pre-Op Diagnosis Codes:      * Coronary atherosclerosis of native coronary artery [I25.10]    Post-Op Diagnosis: Same       Procedure(s):  CORONARY ARTERY BYPASS GRAFTING X2 (LIMA-LAD, SVG-PDA); ENDOSCOPIC HARVEST OF LEFT GREATER SAPHENOUS VEIN    Surgeon(s):  Robert Cruz MD Donley, Alison A, PA-C    Assistant:  Surgical Assistant: Brooke Clifford Keith, PA-C    Anesthesia: General    Estimated Blood Loss (mL): 300     Complications: None    Specimens:   * No specimens in log *    Implants:  * No implants in log *      Drains:   Chest Tube Left Pleural (Active)   Status Continuous Suction 02/05/25 1243   Suction -20 cm H2O 02/05/25 1243   Y Connector Used Yes 02/05/25 1243   Dressing Status New dressing applied 02/05/25 1243   Chest Tube Dressing Split Gauze 02/05/25 1243   Site Assessment Clean, dry & intact 02/05/25 1243   Surrounding Skin Clean, dry & intact 02/05/25 1243       Chest Tube Mediastinal 2 (Active)   Status Continuous Suction 02/05/25 1242   Suction -20 cm H2O 02/05/25 1242   Y Connector Used Yes 02/05/25 1242   Chest Tube Dressing Split Gauze 02/05/25 1242   Site Assessment Clean, dry & intact 02/05/25 1242   Surrounding Skin Clean, dry & intact 02/05/25 1242       Urinary Catheter 02/05/25 Dooley-Temperature (Active)   Urine Color Yellow 02/05/25 1236   Urine Appearance Clear 02/05/25 1236   Status Draining;Patent 02/05/25 1236   Output (mL) 800 mL 02/05/25 1236       Findings:  Infection Present At Time Of Surgery (PATOS) (choose all levels that have infection present):  No infection present  Other Findings:     Electronically signed by Og Su PA-C on 2/5/2025 at 2:38 PM

## 2025-02-05 NOTE — INTERVAL H&P NOTE
Update History & Physical    The patient's History and Physical of January 28, 2025 was reviewed with the patient and I examined the patient. There was no change. The surgical site was confirmed by the patient and me.     Plan: The risks, benefits, expected outcome, and alternative to the recommended procedure have been discussed with the patient. Patient understands and wants to proceed with the procedure.     Electronically signed by Robert Cruz MD on 2/5/2025 at 8:24 AM

## 2025-02-05 NOTE — ANESTHESIA POSTPROCEDURE EVALUATION
Department of Anesthesiology  Postprocedure Note    Patient: Kian Frias  MRN: [id number]  YOB: 1960  Date of evaluation: 2/5/2025    Procedure Summary       Date: 02/05/25 Room / Location: [address]    Anesthesia Start: 0956 Anesthesia Stop: 1310    Procedure: CORONARY ARTERY BYPASS GRAFTING X2 (LIMA-LAD, SVG-PDA); ENDOSCOPIC HARVEST OF LEFT GREATER SAPHENOUS VEIN (Chest) Diagnosis:       Coronary atherosclerosis of native coronary artery      (Coronary atherosclerosis of native coronary artery [I25.10])    Surgeons: Robert Cruz MD Responsible Provider: Camryn Posada MD    Anesthesia Type: General ASA Status: 4            Anesthesia Type: General    Radha Phase I:      Radha Phase II:      Anesthesia Post Evaluation    Patient location during evaluation: ICU  Patient participation: waiting for patient participation (sedated)  Level of consciousness: sedated and ventilated  Pain score: 0  Airway patency: patent  Nausea & Vomiting: no nausea and no vomiting  Cardiovascular status: hemodynamically stable (no pressor)  Respiratory status: intubated and ventilator  Hydration status: stable  Multimodal analgesia pain management approach  Pain management: adequate    There were no known notable events for this encounter.

## 2025-02-05 NOTE — ANESTHESIA PRE PROCEDURE
Department of Anesthesiology  Preprocedure Note       Name:  Kian Frias   Age:  64 y.o.  :  1960                                          MRN:  [id number]         Date:  2025      Surgeon: Surgeon(s):  Robert Cruz MD    Procedure: Procedure(s):  CORONARY ARTERY BYPASS GRAFT WITH [facility]    Medications prior to admission:   Prior to Admission medications    Medication Sig Start Date End Date Taking? Authorizing Provider   co-enzyme Q-10 30 MG CAPS capsule Take 1 capsule by mouth daily   Yes Kevin Vega MD   Red Yeast Rice 600 MG CAPS Take by mouth daily   Yes Kevin Vega MD   Milk Thistle 140 MG CAPS Take by mouth daily   Yes Kevin Vega MD   metoprolol succinate (TOPROL XL) 25 MG extended release tablet Take 1 tablet by mouth daily 25  Yes Asya Kuhn MD   pravastatin (PRAVACHOL) 10 MG tablet Take 1 tablet by mouth nightly 25  Yes Asya Kuhn MD   latanoprost (XALATAN) 0.005 % ophthalmic solution Place 1 drop into both eyes nightly   Yes Kevin Vega MD   aspirin 81 MG tablet Take 1 tablet by mouth daily   Yes Kevin Vega MD   mupirocin (BACTROBAN) 2 % ointment Apply intranasally daily for 5 days prior to surgery 25   Eugenia Huggins PA-C   clopidogrel (PLAVIX) 75 MG tablet Take 1 tablet by mouth daily 25   Asya Kuhn MD   metFORMIN (GLUCOPHAGE) 500 MG tablet Take 1 tablet by mouth 2 times daily (with meals) 25   Asya Kuhn MD       Current medications:    Current Facility-Administered Medications   Medication Dose Route Frequency Provider Last Rate Last Admin   • sodium chloride flush 0.9 % injection 5-40 mL  5-40 mL IntraVENous 2 times per day Radha Savage MD       • sodium chloride flush 0.9 % injection 5-40 mL  5-40 mL IntraVENous PRN Radha Savage MD       • 0.9 % sodium chloride infusion   IntraVENous PRN Radha Savage MD       • sodium chloride flush 0.9 %

## 2025-02-05 NOTE — ANESTHESIA PROCEDURE NOTES
Arterial Line:    An arterial line was placed using ultrasound guidance and surface landmarks, in the OR for the following indication(s): continuous blood pressure monitoring and blood sampling needed.    A 20 gauge (size), 1 and 3/8 inch (length), Arrow (type) catheter was placed, Seldinger technique not used, into the left radial artery, secured by tape.  Anesthesia type: Local    Events:  patient tolerated procedure well with no complications and EBL < 5mL.2/5/2025 10:02 AM2/5/2025 10:03 AM  Resident/CRNA: Edgar Strong APRN - CRNA  Performed: Resident/CRNA   Preanesthetic Checklist  Completed: patient identified, IV checked, site marked, risks and benefits discussed, surgical/procedural consents, equipment checked, pre-op evaluation, timeout performed, anesthesia consent given, oxygen available, monitors applied/VS acknowledged, fire risk safety assessment completed and verbalized and blood product R/B/A discussed and consented          
Central Venous Line:    A central venous line was placed using ultrasound guidance, in the OR for the following indication(s): central venous access and CVP monitoring.2/5/2025 10:07 AM2/5/2025 10:15 AM    Sterility preparation included the following: provider used sterile gloves, gown, hat and mask, hand hygiene performed prior to central venous catheter insertion, sterile gel and probe cover used in ultrasound-guided central venous catheter insertion and maximum sterile barriers used during central venous catheter insertion.    The patient was placed in Trendelenburg position.The right internal jugular vein was prepped.    The site was prepped with Chloraprep.  A 9 Fr (size), 10 (length), introducer double lumen was placed.    During the procedure, the following specific steps were taken: target vein identified, needle advanced into vein and blood aspirated and guidewire advanced into vein.    Intravenous verification was obtained by ultrasound, venous blood return, KATELYN and KATELYN.    Post insertion care included: all ports aspirated, all ports flushed easily, guidewire removed intact, Biopatch applied, line sutured in place and dressing applied.    During the procedure the patient experienced: patient tolerated procedure well with no complications and EBL < 5mL.      Insertion site scrubbed per usage guidelines?: Yes  Skin prep agent dried for 3 minutes prior to procedure?:yes  Outcomes: uncomplicated and patient tolerated procedure well  Real-time US image taken/store: Yes  Anesthesia type: general..No  Staffing  Performed: Resident/CRNA   Anesthesiologist: Camryn Posada MD  Resident/CRNA: Edgar Strong APRN - CRNA  Performed by: Edgar Strong APRN - CRNA  Authorized by: Camryn Posada MD    Preanesthetic Checklist  Completed: patient identified, IV checked, site marked, risks and benefits discussed, surgical/procedural consents, equipment checked, pre-op evaluation, timeout performed, anesthesia consent given, oxygen 
Hypertrophy  Thrombus  Global FXN  EF    RV  normal    No  No  normal      LV  normal    No  No  normal (50-70%)         Ventricular Regional Function:  1- Basal Anteroseptal:  normal  2- Basal Anterior:  normal  3- Basal Anterolateral:  normal  4- Basal Inferolateral:  normal  5- Basal Inferior:  normal  6- Basal Inferoseptal:  normal  7- Mid Anteroseptal:  normal  8- Mid Anterior:  normal  9- Mid Anterolateral:  normal  10- Mid Inferolateral:  normal  11- Mid Inferior:  normal  12- Mid Inferoseptal:  normal        Valves     Valves  Annulus  Stenosis Measurements   Regurg  Leaflet   Morph  Leaflet   Motion Valve Comments    Aortic normal Stenosis none.   Area:1.65 cm²   Peak Gradient:9 mmHg  Mean Gradient: 4 mmHg    none   calcified normal       Mitral normal none           VC Width:0.3 cm    mild normal normal    Tricuspid normal   not present         trace   normal   normal      Pulmonic normal   not present         none              Aorta     Aorta  Size  Diam(cm)  Dissection [place] Classification    Ascending normal         Arch normal        Descending normal    Dissection not present.        Annulus Diameter: 23 mm  Sinotubular junction: 28 mm      Atria     Size  SEC (smoke)  Thrombus  Tumor  Device    Rt Atrium normal No.   No.   No.   No.        Lt Atrium normal No.   No No No.         Left Atrial Appendage: normal        Septa    Interatrial Septal Morphology: normal          Interventricular Septal Morphology: normal          Diastolic Function Measurements:  Diastolic Dysfunction Grade= I (Delayed relaxation)  E=  80 ms  A=  64 ms  E/A Ratio=  1.3  DT=  ms  S/D=  1.1  IVRT=    Other Findings  Pericardium:  normal  Pleural Effusion:  none  Pulmonary Arteries:  normal  Pulmonary Venous Flow:  normal  Post Intervention Follow-up Study  Ventricular Global Function: unchanged.        Valve  Function  Regurgitation  Area Prosthetic?    Aortic  unchanged       Mitral  unchanged       Tricuspid  unchanged

## 2025-02-06 ENCOUNTER — APPOINTMENT (OUTPATIENT)
Dept: GENERAL RADIOLOGY | Age: 65
DRG: 233 | End: 2025-02-06
Attending: THORACIC SURGERY (CARDIOTHORACIC VASCULAR SURGERY)
Payer: MEDICARE

## 2025-02-06 ENCOUNTER — APPOINTMENT (OUTPATIENT)
Dept: NON INVASIVE DIAGNOSTICS | Age: 65
DRG: 233 | End: 2025-02-06
Attending: INTERNAL MEDICINE
Payer: MEDICARE

## 2025-02-06 PROBLEM — I21.02 ST ELEVATION MYOCARDIAL INFARCTION INVOLVING LEFT ANTERIOR DESCENDING (LAD) CORONARY ARTERY (HCC): Status: ACTIVE | Noted: 2025-01-16

## 2025-02-06 PROBLEM — I25.720 ATHEROSCLEROSIS OF AUTOLOGOUS ARTERY CORONARY ARTERY BYPASS GRAFT WITH UNSTABLE ANGINA PECTORIS (HCC): Status: ACTIVE | Noted: 2025-02-06

## 2025-02-06 LAB
ABO/RH: NORMAL
ACTIVATED CLOTTING TIME, HIGH RANGE: 112 SEC (ref 81–125)
ACTIVATED CLOTTING TIME, HIGH RANGE: 165 SEC (ref 81–125)
ANION GAP SERPL CALCULATED.3IONS-SCNC: 10 MMOL/L (ref 9–17)
ANTIBODY SCREEN: NEGATIVE
BLOOD BANK COMMENT: NORMAL
BLOOD BANK DISPENSE STATUS: NORMAL
BLOOD BANK DISPENSE STATUS: NORMAL
BLOOD BANK SAMPLE EXPIRATION: NORMAL
BPU ID: NORMAL
BPU ID: NORMAL
BUN BLD-MCNC: 10 MG/DL (ref 7–20)
BUN SERPL-MCNC: 11 MG/DL (ref 7–20)
CA-I BLD-SCNC: 1.18 MMOL/L (ref 1.15–1.33)
CA-I BLD-SCNC: 1.24 MMOL/L (ref 1.15–1.33)
CA-I BLD-SCNC: 1.25 MMOL/L (ref 1.15–1.33)
CA-I BLD-SCNC: 1.26 MMOL/L (ref 1.15–1.33)
CA-I BLD-SCNC: 1.29 MMOL/L (ref 1.15–1.33)
CALCIUM SERPL-MCNC: 8.6 MG/DL (ref 8.3–10.6)
CHLORIDE BLD-SCNC: 112 MMOL/L (ref 99–110)
CHLORIDE BLD-SCNC: 112 MMOL/L (ref 99–110)
CHLORIDE BLD-SCNC: 113 MMOL/L (ref 99–110)
CHLORIDE SERPL-SCNC: 111 MMOL/L (ref 99–110)
CO2 SERPL-SCNC: 22 MMOL/L (ref 21–32)
COMPONENT: NORMAL
COMPONENT: NORMAL
CREAT BLD-MCNC: 0.9 MG/DL (ref 0.5–1.2)
CREAT SERPL-MCNC: 0.9 MG/DL (ref 0.8–1.3)
CROSSMATCH RESULT: NORMAL
CROSSMATCH RESULT: NORMAL
ECHO BSA: 2.19 M2
ECHO BSA: 2.22 M2
ECHO LV EDV A4C: 24 ML
ECHO LV EDV INDEX A4C: 11 ML/M2
ECHO LV EF PHYSICIAN: 55 %
ECHO LV EJECTION FRACTION A4C: 66 %
ECHO LV ESV A4C: 8 ML
ECHO LV ESV INDEX A4C: 4 ML/M2
ECHO LV FRACTIONAL SHORTENING: 33 % (ref 28–44)
ECHO LV INTERNAL DIMENSION DIASTOLE INDEX: 1.66 CM/M2
ECHO LV INTERNAL DIMENSION DIASTOLIC: 3.6 CM (ref 4.2–5.9)
ECHO LV INTERNAL DIMENSION SYSTOLIC INDEX: 1.11 CM/M2
ECHO LV INTERNAL DIMENSION SYSTOLIC: 2.4 CM
ECHO LV IVSD: 1.3 CM (ref 0.6–1)
ECHO LV MASS 2D: 132.7 G (ref 88–224)
ECHO LV MASS INDEX 2D: 61.1 G/M2 (ref 49–115)
ECHO LV POSTERIOR WALL DIASTOLIC: 1 CM (ref 0.6–1)
ECHO LV RELATIVE WALL THICKNESS RATIO: 0.56
EGFR, POC: >90 ML/MIN/1.73M2
ERYTHROCYTE [DISTWIDTH] IN BLOOD BY AUTOMATED COUNT: 13.5 % (ref 11.7–14.9)
FIBRINOGEN PPP-MCNC: 279 MG/DL (ref 170–540)
GFR, ESTIMATED: >90 ML/MIN/1.73M2
GLUCOSE BLD-MCNC: 102 MG/DL (ref 74–99)
GLUCOSE BLD-MCNC: 102 MG/DL (ref 74–99)
GLUCOSE BLD-MCNC: 105 MG/DL (ref 74–99)
GLUCOSE BLD-MCNC: 105 MG/DL (ref 74–99)
GLUCOSE BLD-MCNC: 107 MG/DL (ref 74–99)
GLUCOSE BLD-MCNC: 111 MG/DL (ref 74–99)
GLUCOSE BLD-MCNC: 112 MG/DL (ref 74–99)
GLUCOSE BLD-MCNC: 112 MG/DL (ref 74–99)
GLUCOSE BLD-MCNC: 114 MG/DL (ref 74–99)
GLUCOSE BLD-MCNC: 115 MG/DL (ref 74–99)
GLUCOSE BLD-MCNC: 117 MG/DL (ref 74–99)
GLUCOSE BLD-MCNC: 117 MG/DL (ref 74–99)
GLUCOSE BLD-MCNC: 121 MG/DL (ref 74–99)
GLUCOSE BLD-MCNC: 122 MG/DL (ref 74–99)
GLUCOSE BLD-MCNC: 130 MG/DL (ref 74–99)
GLUCOSE BLD-MCNC: 132 MG/DL (ref 74–99)
GLUCOSE BLD-MCNC: 132 MG/DL (ref 74–99)
GLUCOSE BLD-MCNC: 135 MG/DL (ref 74–99)
GLUCOSE BLD-MCNC: 146 MG/DL (ref 74–99)
GLUCOSE BLD-MCNC: 96 MG/DL (ref 74–99)
GLUCOSE SERPL-MCNC: 116 MG/DL (ref 74–99)
HCT VFR BLD AUTO: 40 % (ref 38–51)
HCT VFR BLD AUTO: 40 % (ref 38–51)
HCT VFR BLD AUTO: 41.3 % (ref 42–52)
HCT VFR BLD AUTO: 42 % (ref 38–51)
HGB BLD-MCNC: 13.7 G/DL (ref 13.5–18)
INR PPP: 1.2
MAGNESIUM SERPL-MCNC: 2.4 MG/DL (ref 1.8–2.4)
MCH RBC QN AUTO: 30.6 PG (ref 27–31)
MCHC RBC AUTO-ENTMCNC: 33.2 G/DL (ref 32–36)
MCV RBC AUTO: 92.4 FL (ref 78–100)
NEGATIVE BASE EXCESS, ART: 0 MMOL/L (ref 0–3)
NEGATIVE BASE EXCESS, ART: 1.5 MMOL/L (ref 0–3)
NEGATIVE BASE EXCESS, ART: 1.7 MMOL/L (ref 0–3)
PARTIAL THROMBOPLASTIN TIME: 29.5 SEC (ref 25.1–37.1)
PHOSPHATE SERPL-MCNC: 3.3 MG/DL (ref 2.5–4.9)
PLATELET # BLD AUTO: 202 K/UL (ref 140–440)
PMV BLD AUTO: 10.6 FL (ref 7.5–11.1)
POC ANION GAP: 10 MMOL/L (ref 7–16)
POC ANION GAP: 8 MMOL/L (ref 7–16)
POC ANION GAP: 8 MMOL/L (ref 7–16)
POC HCO3: 23.7 MMOL/L (ref 21–28)
POC HCO3: 24 MMOL/L (ref 21–28)
POC HCO3: 25.9 MMOL/L (ref 21–28)
POC HEMOGLOBIN (CALC): 13.7 G/DL (ref 12–17)
POC HEMOGLOBIN (CALC): 13.7 G/DL (ref 12–17)
POC HEMOGLOBIN (CALC): 14.1 G/DL (ref 12–17)
POC O2 SATURATION: 96.2 % (ref 94–98)
POC O2 SATURATION: 97.3 % (ref 94–98)
POC O2 SATURATION: 97.6 % (ref 94–98)
POC PCO2: 41.5 MM HG (ref 35–48)
POC PCO2: 42.3 MM HG (ref 35–48)
POC PCO2: 46.1 MM HG (ref 35–48)
POC PH: 7.36 (ref 7.35–7.45)
POC PH: 7.36 (ref 7.35–7.45)
POC PH: 7.37 (ref 7.35–7.45)
POC PO2: 100.6 MM HG (ref 83–108)
POC PO2: 87.8 MM HG (ref 83–108)
POC PO2: 97.5 MM HG (ref 83–108)
POTASSIUM BLD-SCNC: 4.4 MMOL/L (ref 3.5–5.1)
POTASSIUM BLD-SCNC: 4.5 MMOL/L (ref 3.5–5.1)
POTASSIUM BLD-SCNC: 4.6 MMOL/L (ref 3.5–5.1)
POTASSIUM SERPL-SCNC: 4.6 MMOL/L (ref 3.5–5.1)
PROTHROMBIN TIME: 15.8 SEC (ref 11.7–14.5)
RBC # BLD AUTO: 4.47 M/UL (ref 4.6–6.2)
SODIUM BLD-SCNC: 145 MMOL/L (ref 136–145)
SODIUM BLD-SCNC: 146 MMOL/L (ref 136–145)
SODIUM BLD-SCNC: 146 MMOL/L (ref 136–145)
SODIUM SERPL-SCNC: 144 MMOL/L (ref 136–145)
TCO2 (CALC), ART: 25 MMOL/L (ref 21–32)
TCO2 (CALC), ART: 25 MMOL/L (ref 21–32)
TCO2 (CALC), ART: 27 MMOL/L (ref 21–32)
TRANSFUSION STATUS: NORMAL
TRANSFUSION STATUS: NORMAL
UNIT DIVISION: 0
UNIT DIVISION: 0
WBC OTHER # BLD: 15.7 K/UL (ref 4–10.5)

## 2025-02-06 PROCEDURE — 2580000003 HC RX 258: Performed by: PHYSICIAN ASSISTANT

## 2025-02-06 PROCEDURE — 71045 X-RAY EXAM CHEST 1 VIEW: CPT

## 2025-02-06 PROCEDURE — P9045 ALBUMIN (HUMAN), 5%, 250 ML: HCPCS | Performed by: THORACIC SURGERY (CARDIOTHORACIC VASCULAR SURGERY)

## 2025-02-06 PROCEDURE — 93455 CORONARY ART/GRFT ANGIO S&I: CPT | Performed by: INTERNAL MEDICINE

## 2025-02-06 PROCEDURE — 6360000002 HC RX W HCPCS: Performed by: THORACIC SURGERY (CARDIOTHORACIC VASCULAR SURGERY)

## 2025-02-06 PROCEDURE — 93459 L HRT ART/GRFT ANGIO: CPT | Performed by: INTERNAL MEDICINE

## 2025-02-06 PROCEDURE — 85384 FIBRINOGEN ACTIVITY: CPT

## 2025-02-06 PROCEDURE — 85027 COMPLETE CBC AUTOMATED: CPT

## 2025-02-06 PROCEDURE — 94669 MECHANICAL CHEST WALL OSCILL: CPT

## 2025-02-06 PROCEDURE — 97162 PT EVAL MOD COMPLEX 30 MIN: CPT

## 2025-02-06 PROCEDURE — 6360000002 HC RX W HCPCS: Performed by: INTERNAL MEDICINE

## 2025-02-06 PROCEDURE — 2500000003 HC RX 250 WO HCPCS: Performed by: PHYSICIAN ASSISTANT

## 2025-02-06 PROCEDURE — 80048 BASIC METABOLIC PNL TOTAL CA: CPT

## 2025-02-06 PROCEDURE — 6360000002 HC RX W HCPCS: Performed by: PHYSICIAN ASSISTANT

## 2025-02-06 PROCEDURE — 85014 HEMATOCRIT: CPT

## 2025-02-06 PROCEDURE — 4A023N7 MEASUREMENT OF CARDIAC SAMPLING AND PRESSURE, LEFT HEART, PERCUTANEOUS APPROACH: ICD-10-PCS | Performed by: THORACIC SURGERY (CARDIOTHORACIC VASCULAR SURGERY)

## 2025-02-06 PROCEDURE — 6360000004 HC RX CONTRAST MEDICATION: Performed by: INTERNAL MEDICINE

## 2025-02-06 PROCEDURE — 85730 THROMBOPLASTIN TIME PARTIAL: CPT

## 2025-02-06 PROCEDURE — 6370000000 HC RX 637 (ALT 250 FOR IP): Performed by: PHYSICIAN ASSISTANT

## 2025-02-06 PROCEDURE — 93005 ELECTROCARDIOGRAM TRACING: CPT | Performed by: INTERNAL MEDICINE

## 2025-02-06 PROCEDURE — 97116 GAIT TRAINING THERAPY: CPT

## 2025-02-06 PROCEDURE — 2709999900 HC NON-CHARGEABLE SUPPLY: Performed by: INTERNAL MEDICINE

## 2025-02-06 PROCEDURE — 2700000000 HC OXYGEN THERAPY PER DAY

## 2025-02-06 PROCEDURE — 93308 TTE F-UP OR LMTD: CPT | Performed by: INTERNAL MEDICINE

## 2025-02-06 PROCEDURE — 97166 OT EVAL MOD COMPLEX 45 MIN: CPT

## 2025-02-06 PROCEDURE — 85610 PROTHROMBIN TIME: CPT

## 2025-02-06 PROCEDURE — 80047 BASIC METABLC PNL IONIZED CA: CPT

## 2025-02-06 PROCEDURE — 84100 ASSAY OF PHOSPHORUS: CPT

## 2025-02-06 PROCEDURE — C1769 GUIDE WIRE: HCPCS | Performed by: INTERNAL MEDICINE

## 2025-02-06 PROCEDURE — 83735 ASSAY OF MAGNESIUM: CPT

## 2025-02-06 PROCEDURE — 93321 DOPPLER ECHO F-UP/LMTD STD: CPT

## 2025-02-06 PROCEDURE — 99152 MOD SED SAME PHYS/QHP 5/>YRS: CPT | Performed by: INTERNAL MEDICINE

## 2025-02-06 PROCEDURE — 97530 THERAPEUTIC ACTIVITIES: CPT

## 2025-02-06 PROCEDURE — 93005 ELECTROCARDIOGRAM TRACING: CPT | Performed by: PHYSICIAN ASSISTANT

## 2025-02-06 PROCEDURE — B2111ZZ FLUOROSCOPY OF MULTIPLE CORONARY ARTERIES USING LOW OSMOLAR CONTRAST: ICD-10-PCS | Performed by: THORACIC SURGERY (CARDIOTHORACIC VASCULAR SURGERY)

## 2025-02-06 PROCEDURE — 99291 CRITICAL CARE FIRST HOUR: CPT | Performed by: INTERNAL MEDICINE

## 2025-02-06 PROCEDURE — 82962 GLUCOSE BLOOD TEST: CPT

## 2025-02-06 PROCEDURE — 93325 DOPPLER ECHO COLOR FLOW MAPG: CPT | Performed by: INTERNAL MEDICINE

## 2025-02-06 PROCEDURE — 94640 AIRWAY INHALATION TREATMENT: CPT

## 2025-02-06 PROCEDURE — C1894 INTRO/SHEATH, NON-LASER: HCPCS | Performed by: INTERNAL MEDICINE

## 2025-02-06 PROCEDURE — 93005 ELECTROCARDIOGRAM TRACING: CPT | Performed by: THORACIC SURGERY (CARDIOTHORACIC VASCULAR SURGERY)

## 2025-02-06 PROCEDURE — 82330 ASSAY OF CALCIUM: CPT

## 2025-02-06 PROCEDURE — 2100000000 HC CCU R&B

## 2025-02-06 PROCEDURE — 94761 N-INVAS EAR/PLS OXIMETRY MLT: CPT

## 2025-02-06 PROCEDURE — 82803 BLOOD GASES ANY COMBINATION: CPT

## 2025-02-06 PROCEDURE — 94150 VITAL CAPACITY TEST: CPT

## 2025-02-06 RX ORDER — FUROSEMIDE 10 MG/ML
20 INJECTION INTRAMUSCULAR; INTRAVENOUS ONCE
Status: COMPLETED | OUTPATIENT
Start: 2025-02-06 | End: 2025-02-06

## 2025-02-06 RX ORDER — POTASSIUM CHLORIDE 1500 MG/1
10 TABLET, EXTENDED RELEASE ORAL ONCE
Status: COMPLETED | OUTPATIENT
Start: 2025-02-06 | End: 2025-02-06

## 2025-02-06 RX ORDER — INSULIN LISPRO 100 [IU]/ML
0-8 INJECTION, SOLUTION INTRAVENOUS; SUBCUTANEOUS
Status: DISCONTINUED | OUTPATIENT
Start: 2025-02-06 | End: 2025-02-09 | Stop reason: HOSPADM

## 2025-02-06 RX ORDER — ALBUMIN HUMAN 50 G/1000ML
25 SOLUTION INTRAVENOUS ONCE
Status: COMPLETED | OUTPATIENT
Start: 2025-02-06 | End: 2025-02-06

## 2025-02-06 RX ORDER — METOPROLOL TARTRATE 25 MG/1
25 TABLET, FILM COATED ORAL 2 TIMES DAILY
Status: DISCONTINUED | OUTPATIENT
Start: 2025-02-06 | End: 2025-02-06

## 2025-02-06 RX ORDER — GUAIFENESIN 600 MG/1
1200 TABLET, EXTENDED RELEASE ORAL 2 TIMES DAILY
Status: DISCONTINUED | OUTPATIENT
Start: 2025-02-06 | End: 2025-02-09 | Stop reason: HOSPADM

## 2025-02-06 RX ORDER — INSULIN LISPRO 100 [IU]/ML
0-8 INJECTION, SOLUTION INTRAVENOUS; SUBCUTANEOUS
Status: DISCONTINUED | OUTPATIENT
Start: 2025-02-06 | End: 2025-02-06

## 2025-02-06 RX ORDER — SODIUM CHLORIDE 9 MG/ML
INJECTION, SOLUTION INTRAVENOUS PRN
Status: DISCONTINUED | OUTPATIENT
Start: 2025-02-06 | End: 2025-02-06

## 2025-02-06 RX ORDER — IOPAMIDOL 755 MG/ML
INJECTION, SOLUTION INTRAVASCULAR PRN
Status: DISCONTINUED | OUTPATIENT
Start: 2025-02-06 | End: 2025-02-06 | Stop reason: HOSPADM

## 2025-02-06 RX ORDER — SCOPOLAMINE 1 MG/3D
1 PATCH, EXTENDED RELEASE TRANSDERMAL
Status: DISCONTINUED | OUTPATIENT
Start: 2025-02-06 | End: 2025-02-06

## 2025-02-06 RX ORDER — ALBUMIN HUMAN 50 G/1000ML
12.5 SOLUTION INTRAVENOUS ONCE
Status: COMPLETED | OUTPATIENT
Start: 2025-02-06 | End: 2025-02-06

## 2025-02-06 RX ORDER — SODIUM CHLORIDE 0.9 % (FLUSH) 0.9 %
5-40 SYRINGE (ML) INJECTION EVERY 12 HOURS SCHEDULED
Status: DISCONTINUED | OUTPATIENT
Start: 2025-02-06 | End: 2025-02-06

## 2025-02-06 RX ORDER — METOPROLOL TARTRATE 25 MG/1
25 TABLET, FILM COATED ORAL 2 TIMES DAILY
Status: DISCONTINUED | OUTPATIENT
Start: 2025-02-06 | End: 2025-02-07

## 2025-02-06 RX ORDER — ACETAMINOPHEN 325 MG/1
650 TABLET ORAL EVERY 4 HOURS PRN
Status: DISCONTINUED | OUTPATIENT
Start: 2025-02-06 | End: 2025-02-06

## 2025-02-06 RX ORDER — SODIUM CHLORIDE 0.9 % (FLUSH) 0.9 %
5-40 SYRINGE (ML) INJECTION PRN
Status: DISCONTINUED | OUTPATIENT
Start: 2025-02-06 | End: 2025-02-09 | Stop reason: HOSPADM

## 2025-02-06 RX ADMIN — MUPIROCIN: 20 OINTMENT TOPICAL at 21:06

## 2025-02-06 RX ADMIN — HEPARIN SODIUM 5000 UNITS: 5000 INJECTION INTRAVENOUS; SUBCUTANEOUS at 06:23

## 2025-02-06 RX ADMIN — METHOCARBAMOL 500 MG: 500 TABLET ORAL at 14:35

## 2025-02-06 RX ADMIN — LATANOPROST 1 DROP: 50 SOLUTION OPHTHALMIC at 20:45

## 2025-02-06 RX ADMIN — ACETAMINOPHEN 1000 MG: 500 TABLET ORAL at 06:23

## 2025-02-06 RX ADMIN — CHLORHEXIDINE GLUCONATE 0.12% ORAL RINSE 15 ML: 1.2 LIQUID ORAL at 09:42

## 2025-02-06 RX ADMIN — FAMOTIDINE 20 MG: 20 TABLET, FILM COATED ORAL at 09:42

## 2025-02-06 RX ADMIN — Medication 1 TABLET: at 09:42

## 2025-02-06 RX ADMIN — WATER 2000 MG: 1 INJECTION INTRAMUSCULAR; INTRAVENOUS; SUBCUTANEOUS at 05:10

## 2025-02-06 RX ADMIN — HEPARIN SODIUM 5000 UNITS: 5000 INJECTION INTRAVENOUS; SUBCUTANEOUS at 14:36

## 2025-02-06 RX ADMIN — GABAPENTIN 300 MG: 300 CAPSULE ORAL at 14:35

## 2025-02-06 RX ADMIN — ASPIRIN 81 MG: 81 TABLET, CHEWABLE ORAL at 09:42

## 2025-02-06 RX ADMIN — FUROSEMIDE 20 MG: 10 INJECTION, SOLUTION INTRAMUSCULAR; INTRAVENOUS at 16:17

## 2025-02-06 RX ADMIN — METOPROLOL TARTRATE 25 MG: 25 TABLET, FILM COATED ORAL at 14:54

## 2025-02-06 RX ADMIN — ALBUMIN (HUMAN) 25 G: 12.5 INJECTION, SOLUTION INTRAVENOUS at 09:39

## 2025-02-06 RX ADMIN — HEPARIN SODIUM 5000 UNITS: 5000 INJECTION INTRAVENOUS; SUBCUTANEOUS at 20:43

## 2025-02-06 RX ADMIN — POLYETHYLENE GLYCOL (3350) 17 G: 17 POWDER, FOR SOLUTION ORAL at 09:42

## 2025-02-06 RX ADMIN — GABAPENTIN 300 MG: 300 CAPSULE ORAL at 20:43

## 2025-02-06 RX ADMIN — SODIUM CHLORIDE, PRESERVATIVE FREE 10 ML: 5 INJECTION INTRAVENOUS at 20:45

## 2025-02-06 RX ADMIN — ALBUMIN (HUMAN) 12.5 G: 12.5 INJECTION, SOLUTION INTRAVENOUS at 12:35

## 2025-02-06 RX ADMIN — WATER 2000 MG: 1 INJECTION INTRAMUSCULAR; INTRAVENOUS; SUBCUTANEOUS at 14:35

## 2025-02-06 RX ADMIN — IPRATROPIUM BROMIDE AND ALBUTEROL SULFATE 1 DOSE: .5; 2.5 SOLUTION RESPIRATORY (INHALATION) at 12:10

## 2025-02-06 RX ADMIN — SODIUM CHLORIDE: 9 INJECTION, SOLUTION INTRAVENOUS at 11:05

## 2025-02-06 RX ADMIN — IPRATROPIUM BROMIDE AND ALBUTEROL SULFATE 1 DOSE: .5; 2.5 SOLUTION RESPIRATORY (INHALATION) at 16:27

## 2025-02-06 RX ADMIN — SENNOSIDES AND DOCUSATE SODIUM 1 TABLET: 50; 8.6 TABLET ORAL at 09:42

## 2025-02-06 RX ADMIN — MUPIROCIN: 20 OINTMENT TOPICAL at 09:42

## 2025-02-06 RX ADMIN — CHLORHEXIDINE GLUCONATE 0.12% ORAL RINSE 15 ML: 1.2 LIQUID ORAL at 20:43

## 2025-02-06 RX ADMIN — ACETAMINOPHEN 1000 MG: 500 TABLET ORAL at 01:17

## 2025-02-06 RX ADMIN — WATER 2000 MG: 1 INJECTION INTRAMUSCULAR; INTRAVENOUS; SUBCUTANEOUS at 20:43

## 2025-02-06 RX ADMIN — GUAIFENESIN 1200 MG: 600 TABLET ORAL at 14:55

## 2025-02-06 RX ADMIN — SODIUM CHLORIDE, PRESERVATIVE FREE 10 ML: 5 INJECTION INTRAVENOUS at 09:42

## 2025-02-06 RX ADMIN — ACETAMINOPHEN 1000 MG: 500 TABLET ORAL at 11:02

## 2025-02-06 RX ADMIN — SENNOSIDES AND DOCUSATE SODIUM 1 TABLET: 50; 8.6 TABLET ORAL at 20:43

## 2025-02-06 RX ADMIN — POTASSIUM CHLORIDE 10 MEQ: 1500 TABLET, EXTENDED RELEASE ORAL at 16:17

## 2025-02-06 RX ADMIN — FAMOTIDINE 20 MG: 20 TABLET, FILM COATED ORAL at 20:43

## 2025-02-06 RX ADMIN — METHOCARBAMOL 500 MG: 500 TABLET ORAL at 09:42

## 2025-02-06 RX ADMIN — IPRATROPIUM BROMIDE AND ALBUTEROL SULFATE 1 DOSE: .5; 2.5 SOLUTION RESPIRATORY (INHALATION) at 21:12

## 2025-02-06 RX ADMIN — PRAVASTATIN SODIUM 10 MG: 20 TABLET ORAL at 20:45

## 2025-02-06 RX ADMIN — GABAPENTIN 300 MG: 300 CAPSULE ORAL at 09:42

## 2025-02-06 RX ADMIN — TRAMADOL HYDROCHLORIDE 50 MG: 50 TABLET, COATED ORAL at 12:55

## 2025-02-06 RX ADMIN — METHOCARBAMOL 500 MG: 500 TABLET ORAL at 20:43

## 2025-02-06 ASSESSMENT — PAIN DESCRIPTION - DESCRIPTORS
DESCRIPTORS: ACHING
DESCRIPTORS: ACHING;CRAMPING
DESCRIPTORS: ACHING;CRAMPING

## 2025-02-06 ASSESSMENT — PAIN DESCRIPTION - LOCATION
LOCATION: INCISION
LOCATION: CHEST
LOCATION: CHEST

## 2025-02-06 ASSESSMENT — PAIN SCALES - GENERAL
PAINLEVEL_OUTOF10: 0
PAINLEVEL_OUTOF10: 6
PAINLEVEL_OUTOF10: 4
PAINLEVEL_OUTOF10: 0
PAINLEVEL_OUTOF10: 4
PAINLEVEL_OUTOF10: 0

## 2025-02-06 ASSESSMENT — PAIN DESCRIPTION - ORIENTATION
ORIENTATION: MID

## 2025-02-06 ASSESSMENT — PAIN - FUNCTIONAL ASSESSMENT: PAIN_FUNCTIONAL_ASSESSMENT: ACTIVITIES ARE NOT PREVENTED

## 2025-02-06 ASSESSMENT — PAIN SCALES - WONG BAKER: WONGBAKER_NUMERICALRESPONSE: NO HURT

## 2025-02-06 NOTE — CARE COORDINATION
02/06/25 0909   Service Assessment   Patient Orientation Alert and Oriented   Cognition Alert   History Provided By Patient;Medical Record   Primary Caregiver Self   Support Systems Family Members   PCP Verified by CM Yes   Last Visit to PCP Within last 3 months   Prior Functional Level Independent in ADLs/IADLs   Current Functional Level Assistance with the following:;Bathing;Toileting;Mobility  (Hospital policy)   Can patient return to prior living arrangement Yes   Ability to make needs known: Good   Family able to assist with home care needs: Yes   Would you like for me to discuss the discharge plan with any other family members/significant others, and if so, who? Yes  (sister, ANNY)   Financial Resources Medicare   Community Resources None   Social/Functional History   Lives With Alone   Type of Home House   Home Layout One level;Laundry in basement   Home Access Stairs to enter without rails   Entrance Stairs - Number of Steps 1   Bathroom Shower/Tub Walk-in shower   Bathroom Toilet Handicap height   Bathroom Equipment Shower chair;Toilet raiser   Bathroom Accessibility Accessible   Home Equipment Rollator;Walker - Rolling;Lift chair   Receives Help From Family   Prior Level of Assist for ADLs Independent   Prior Level of Assist for Homemaking Independent   Homemaking Responsibilities Yes   Ambulation Assistance Independent   Prior Level of Assist for Transfers Independent   Active  Yes   Occupation Full time employment   Type of Occupation Leave of absence due to health issues.   Discharge Planning   Type of Residence House   Living Arrangements Alone   Current Services Prior To Admission C-pap   Type of Home Care Services None   Patient expects to be discharged to: House   One/Two Story Residence One story   Services At/After Discharge   Transition of Care Consult (CM Consult) Other  (Consult , reason for consult not given)     CM reviewed chart and discussed. Pt has insurance and a PCP. Pt is from

## 2025-02-06 NOTE — PLAN OF CARE
Problem: Discharge Planning  Goal: Discharge to home or other facility with appropriate resources  2/6/2025 1745 by Samara Merida RN  Outcome: Progressing  2/6/2025 0843 by Brooke Bales RN  Outcome: Progressing     Problem: Pain  Goal: Verbalizes/displays adequate comfort level or baseline comfort level  2/6/2025 1745 by Smaara Merida RN  Outcome: Progressing  2/6/2025 0843 by Brooke Bales RN  Outcome: Progressing     Problem: Safety - Adult  Goal: Free from fall injury  2/6/2025 1745 by Samara Merida RN  Outcome: Progressing  2/6/2025 0843 by Brooke Bales RN  Outcome: Progressing     Problem: Safety - Medical Restraint  Goal: Remains free of injury from restraints (Restraint for Interference with Medical Device)  Description: INTERVENTIONS:  1. Determine that other, less restrictive measures have been tried or would not be effective before applying the restraint  2. Evaluate the patient's condition at the time of restraint application  3. Inform patient/family regarding the reason for restraint  4. Q2H: Monitor safety, psychosocial status, comfort, nutrition and hydration  2/6/2025 1745 by Samara Merida RN  Outcome: Progressing  2/6/2025 0843 by Brooke Bales RN  Outcome: Progressing     Problem: Pain  Goal: Verbalizes/displays adequate comfort level or baseline comfort level  2/6/2025 1745 by Samara Merida RN  Outcome: Progressing  2/6/2025 0843 by Brooke Bales RN  Outcome: Progressing     Problem: Safety - Adult  Goal: Free from fall injury  2/6/2025 1745 by Samara Merida RN  Outcome: Progressing  2/6/2025 0843 by Brooke Bales RN  Outcome: Progressing     Problem: Safety - Medical Restraint  Goal: Remains free of injury from restraints (Restraint for Interference with Medical Device)  Description: INTERVENTIONS:  1. Determine that other, less restrictive measures have been tried or would not be effective before applying the restraint  2. Evaluate the patient's condition at the time of restraint

## 2025-02-06 NOTE — PLAN OF CARE
Problem: Discharge Planning  Goal: Discharge to home or other facility with appropriate resources  2/6/2025 0843 by Brooke Bales RN  Outcome: Progressing  2/5/2025 2222 by Niurka Salgado RN  Outcome: Progressing     Problem: Pain  Goal: Verbalizes/displays adequate comfort level or baseline comfort level  2/6/2025 0843 by Brooke Bales RN  Outcome: Progressing  2/5/2025 2222 by Niurka Salgado RN  Outcome: Progressing     Problem: Safety - Adult  Goal: Free from fall injury  2/6/2025 0843 by Brooke Bales RN  Outcome: Progressing  2/5/2025 2222 by Niurka Salgado RN  Outcome: Progressing     Problem: Safety - Medical Restraint  Goal: Remains free of injury from restraints (Restraint for Interference with Medical Device)  Description: INTERVENTIONS:  1. Determine that other, less restrictive measures have been tried or would not be effective before applying the restraint  2. Evaluate the patient's condition at the time of restraint application  3. Inform patient/family regarding the reason for restraint  4. Q2H: Monitor safety, psychosocial status, comfort, nutrition and hydration  2/6/2025 0843 by Brooke Bales RN  Outcome: Progressing  2/5/2025 2222 by Niurka Salgado RN  Outcome: Progressing

## 2025-02-06 NOTE — OP NOTE
aortic cross clamp was removed, and the total aortic occlusion time was 25 minutes.  The ascending aorta was side-clamped, and the one proximal vein graft anastomoses was performed with continuos 6-0 Prolene.        When the patient was rewarmed, cardiopulmonary bypass was discontinued, and the heart took over with a satisfactory action.  The total bypass time was 40 minutes.  The heart was decannulated.  KATELYN showed normal 55% LV function. Hemostasis was secured.Hemostasis was secured.  Ventricular pacing wires were placed on the right ventricle.  Blakes drains were placed in the pericardial cavity and left mediastinal space.The chest was closed in the usual manner with stainless steel wires.   The leg wound was closed with Vicryl to all layers.  The patient was transferred to the HVICU in a satisfactory hemodynamic state.    PA served as first assistant throughout the surgery - no resident is available for our surgical service.      Robert Cruz MD 02/06/25 1:06 PM

## 2025-02-06 NOTE — CONSULTS
Consult Note 25        NAME: Kian Frias  : 1960  MRN: [id number]      Assessment/Plan:  Kian Frias is a 64 y.o. male     Status post CABG, preserved LV function  Postop hypoxemia secondary to atelectasis  Diabetes mellitus type 2  Hyperlipidemia  Sleep apnea (CPAP in home setting)  Recent influenza A respiratory infection    When the patient appropriately awakens, proceed with extubation  Pulmonary hygiene measures  Oxygen support SpO2 goal 90 to 94% via pulse oximetry  Glycemic control  Nocturnal CPAP  DVT ulcer prophylaxis    Complex medical decisions required for evaluation management reviewed with the nursing staff    ANTONIA Garces  [phone]      Chief Complaint / Reason for Consult:    As above    History of Present Illness:    Patient presents from the OR to cardiovascular intensive care unit intubated, status post coronary bypass graft surgery (no acute untoward events reported)    Acceptable hemodynamics, paced rhythm    ROS:    Review of Systems     Unable to obtain    Past Medical, Surgical, Social, Family History:   Past Medical History:   Diagnosis Date    Coronary artery disease 01/15/2025    Heart murmur     \"as a baby assume I outgrew it \"    Hyperlipidemia     Kidney stone     \"one removed 20 yrs ago, passed a  stone 10 yrs ago- and have them in both kidneys now\"( 2017)    NSTEMI (non-ST elevated myocardial infarction) ([facility]) 01/15/2025    DAJUAN (obstructive sleep apnea)     uses CPAP    Sleep apnea     had sleep study done 5 + yrs ago- has cpap    SVT (supraventricular tachycardia) ([facility]) 01/15/2025     Past Surgical History:   Procedure Laterality Date    CARDIAC PROCEDURE N/A 1/15/2025    Left heart cath / coronary angiography performed by Florencio Dubois MD at [facility] CARDIAC CATH LAB    COLONOSCOPY      CYSTOSCOPY      stone manip - 20+ yrs ago    CYSTOSCOPY N/A 2017    with right retrograde pyelogram; right uteroscopy; right ureteral stent placement; laser 
(uses no AD)  Prior Level of Assist for Transfers: Independent  Active : Yes  Occupation: Full time employment  Type of Occupation: Leave of absence due to health issues.    Examination of body systems (includes body structures/functions, activity/participation limitations):  Observation:  Sitting in recliner upon arrival. Cooperative with therapy. Sister visiting.   Vision:  WFL, glasses   Hearing:  WFL   Cardiopulmonary:  Stable vitals on 2L O2     Musculoskeletal  ROM R/L:  WFL BLES   Strength R/L:  BLES grossly WFL in function and endurance.    Neuro:  WFL     Mobility/treatment:   Rolling L/R:  NT   Supine to sit:  NT, sitting in recliner upon arrival and returned to chair at end of session.   Transfers:    Sit to stand: CGA for safety from recliner to RW. VCS for fwd weight shift over CUCO and hand placement to maintain sternal precautions.   Stand to sit: CGA for safety to recliner. VCS for hand placement to maintain sternal precautions.   Sitting balance:  SBA static from edge of recliner unsupported    Standing balance:  CGA static at RW   Gait: ~400ft with RW CGA for safety. Pt demonstrated a fast pace with reciprocal gait pattern. No LOB noted.   Educated pt on POC, role of PT, DME use, discharge, sternal precautions. Cues provided for sequencing to inc safety and indep with mobility.     [facility] 6 Clicks Inpatient Mobility:  AM-PAC Inpatient Mobility Raw Score : 16    Safety: patient left in chair, call light within reach, RN notified, gait belt used.    Assessment:  Pt is a 64 year old male admitted with CAD s/p CABG x 2 on 2/5. Recommend home with initial 24/7 support available and  PT once medically stable. At baseline he is indep with gross mobility and ADLs/IADLs. He performed well this date though slightly below baseline with impaired activity tolerance and balance. He would benefit from continued therapy to address his current deficits, dec potential fall risk, dec burden of care, and restore 
IVPB  2,000 mg IntraVENous PRN Mirella Joyce PA-C        ipratropium 0.5 mg-albuterol 2.5 mg (DUONEB) nebulizer solution 1 Dose  1 Dose Inhalation Q4H WA RT Mirella Joyce PA-C        albumin human 5% IV solution 12.5 g  12.5 g IntraVENous PRN Mirella Joyce PA-C        phenylephrine (SOLEDAD-SYNEPHRINE) 50 mg in sodium chloride 0.9 % 250 mL infusion ([id number])   mcg/min IntraVENous Continuous PRN Mirella Joyce PA-C 6 mL/hr at 02/05/25 1630 20 mcg/min at 02/05/25 1630    niCARdipine (CARDENE) 25 mg in sodium chloride 0.9 % 250 mL infusion ([id number])  2.5-15 mg/hr IntraVENous Continuous PRN Mirella Joyce PA-C        insulin (HumuLIN R) 100 units in sodium chloride 0.9% 100 mL infusion  0.1-50 Units/hr IntraVENous Continuous Mirella Joyce PA-C 3.3 mL/hr at 02/05/25 1704 3.3 Units/hr at 02/05/25 1704    glucose chewable tablet 16 g  4 tablet Oral PRN Mirella Joyce PA-C        dextrose bolus 10% 125 mL  125 mL IntraVENous PRN Mirella Joyce PA-C        Or    dextrose bolus 10% 250 mL  250 mL IntraVENous PRN Mirella Joyce PA-C        glucagon injection 1 mg  1 mg SubCUTAneous PRN Mirella Joyce PA-C        dextrose 10 % infusion   IntraVENous Continuous PRN Mirella Joyce PA-C        gabapentin (NEURONTIN) capsule 300 mg  300 mg Oral TID Mirella Joyce PA-C        traMADol (ULTRAM) tablet 50 mg  50 mg Oral Q6H PRN Mirella Joyce PA-C        dexmedeTOMIDine (PRECEDEX) 400 mcg in sodium chloride 0.9 % 100 mL infusion  0.1-1.5 mcg/kg/hr IntraVENous Continuous Mirella Joyce PA-C   Stopped at 02/05/25 1652    [START ON 2/7/2025] bacitracin ointment   Topical BID Mirella Joyce PA-C        fentaNYL (SUBLIMAZE) injection 25 mcg  25 mcg IntraVENous Q2H PRN Mirella Joyce PA-C        acetaminophen (TYLENOL) tablet 1,000 mg  1,000 mg Oral 4 times per day Mirella Joyce PA-C        [START ON 2/6/2025] heparin (porcine) injection 5,000 Units  5,000 Units

## 2025-02-07 ENCOUNTER — APPOINTMENT (OUTPATIENT)
Dept: GENERAL RADIOLOGY | Age: 65
DRG: 233 | End: 2025-02-07
Attending: THORACIC SURGERY (CARDIOTHORACIC VASCULAR SURGERY)
Payer: MEDICARE

## 2025-02-07 LAB
ANION GAP SERPL CALCULATED.3IONS-SCNC: 10 MMOL/L (ref 9–17)
ANION GAP SERPL CALCULATED.3IONS-SCNC: 9 MMOL/L (ref 9–17)
BUN SERPL-MCNC: 12 MG/DL (ref 7–20)
BUN SERPL-MCNC: 14 MG/DL (ref 7–20)
CA-I BLD-SCNC: 1.11 MMOL/L (ref 1.15–1.33)
CA-I BLD-SCNC: 1.18 MMOL/L (ref 1.15–1.33)
CALCIUM SERPL-MCNC: 8.3 MG/DL (ref 8.3–10.6)
CALCIUM SERPL-MCNC: 8.4 MG/DL (ref 8.3–10.6)
CHLORIDE SERPL-SCNC: 106 MMOL/L (ref 99–110)
CHLORIDE SERPL-SCNC: 107 MMOL/L (ref 99–110)
CO2 SERPL-SCNC: 22 MMOL/L (ref 21–32)
CO2 SERPL-SCNC: 23 MMOL/L (ref 21–32)
CREAT SERPL-MCNC: 0.9 MG/DL (ref 0.8–1.3)
CREAT SERPL-MCNC: 1 MG/DL (ref 0.8–1.3)
EKG ATRIAL RATE: 64 BPM
EKG ATRIAL RATE: 65 BPM
EKG ATRIAL RATE: 75 BPM
EKG ATRIAL RATE: 92 BPM
EKG DIAGNOSIS: NORMAL
EKG P AXIS: 24 DEGREES
EKG P AXIS: 50 DEGREES
EKG P AXIS: 59 DEGREES
EKG P-R INTERVAL: 140 MS
EKG P-R INTERVAL: 146 MS
EKG P-R INTERVAL: 158 MS
EKG Q-T INTERVAL: 324 MS
EKG Q-T INTERVAL: 354 MS
EKG Q-T INTERVAL: 396 MS
EKG Q-T INTERVAL: 400 MS
EKG QRS DURATION: 74 MS
EKG QRS DURATION: 84 MS
EKG QRS DURATION: 86 MS
EKG QRS DURATION: 96 MS
EKG QTC CALCULATION (BAZETT): 411 MS
EKG QTC CALCULATION (BAZETT): 412 MS
EKG QTC CALCULATION (BAZETT): 437 MS
EKG QTC CALCULATION (BAZETT): 467 MS
EKG R AXIS: -10 DEGREES
EKG R AXIS: -17 DEGREES
EKG R AXIS: -9 DEGREES
EKG R AXIS: 4 DEGREES
EKG T AXIS: -13 DEGREES
EKG T AXIS: -19 DEGREES
EKG T AXIS: -21 DEGREES
EKG T AXIS: -33 DEGREES
EKG VENTRICULAR RATE: 125 BPM
EKG VENTRICULAR RATE: 64 BPM
EKG VENTRICULAR RATE: 65 BPM
EKG VENTRICULAR RATE: 92 BPM
ERYTHROCYTE [DISTWIDTH] IN BLOOD BY AUTOMATED COUNT: 13.9 % (ref 11.7–14.9)
FIBRINOGEN PPP-MCNC: 385 MG/DL (ref 170–540)
GFR, ESTIMATED: 78 ML/MIN/1.73M2
GFR, ESTIMATED: >90 ML/MIN/1.73M2
GLUCOSE BLD-MCNC: 103 MG/DL (ref 74–99)
GLUCOSE BLD-MCNC: 135 MG/DL (ref 74–99)
GLUCOSE BLD-MCNC: 141 MG/DL (ref 74–99)
GLUCOSE BLD-MCNC: 94 MG/DL (ref 74–99)
GLUCOSE SERPL-MCNC: 128 MG/DL (ref 74–99)
GLUCOSE SERPL-MCNC: 154 MG/DL (ref 74–99)
HCT VFR BLD AUTO: 36.3 % (ref 42–52)
HGB BLD-MCNC: 12 G/DL (ref 13.5–18)
INR PPP: 1.3
MAGNESIUM SERPL-MCNC: 2 MG/DL (ref 1.8–2.4)
MAGNESIUM SERPL-MCNC: 2.1 MG/DL (ref 1.8–2.4)
MCH RBC QN AUTO: 31 PG (ref 27–31)
MCHC RBC AUTO-ENTMCNC: 33.1 G/DL (ref 32–36)
MCV RBC AUTO: 93.8 FL (ref 78–100)
PARTIAL THROMBOPLASTIN TIME: 29.4 SEC (ref 25.1–37.1)
PHOSPHATE SERPL-MCNC: 1.4 MG/DL (ref 2.5–4.9)
PHOSPHATE SERPL-MCNC: 1.9 MG/DL (ref 2.5–4.9)
PLATELET, FLUORESCENCE: 128 K/UL (ref 140–440)
PMV BLD AUTO: 10.9 FL (ref 7.5–11.1)
POTASSIUM SERPL-SCNC: 4 MMOL/L (ref 3.5–5.1)
POTASSIUM SERPL-SCNC: 4.2 MMOL/L (ref 3.5–5.1)
PROTHROMBIN TIME: 16.8 SEC (ref 11.7–14.5)
RBC # BLD AUTO: 3.87 M/UL (ref 4.6–6.2)
SODIUM SERPL-SCNC: 137 MMOL/L (ref 136–145)
SODIUM SERPL-SCNC: 139 MMOL/L (ref 136–145)
WBC OTHER # BLD: 8.6 K/UL (ref 4–10.5)

## 2025-02-07 PROCEDURE — 6370000000 HC RX 637 (ALT 250 FOR IP): Performed by: PHYSICIAN ASSISTANT

## 2025-02-07 PROCEDURE — 93010 ELECTROCARDIOGRAM REPORT: CPT | Performed by: INTERNAL MEDICINE

## 2025-02-07 PROCEDURE — 82962 GLUCOSE BLOOD TEST: CPT

## 2025-02-07 PROCEDURE — 5A09357 ASSISTANCE WITH RESPIRATORY VENTILATION, LESS THAN 24 CONSECUTIVE HOURS, CONTINUOUS POSITIVE AIRWAY PRESSURE: ICD-10-PCS | Performed by: THORACIC SURGERY (CARDIOTHORACIC VASCULAR SURGERY)

## 2025-02-07 PROCEDURE — 82330 ASSAY OF CALCIUM: CPT

## 2025-02-07 PROCEDURE — 2580000003 HC RX 258: Performed by: PHYSICIAN ASSISTANT

## 2025-02-07 PROCEDURE — 2100000000 HC CCU R&B

## 2025-02-07 PROCEDURE — 85384 FIBRINOGEN ACTIVITY: CPT

## 2025-02-07 PROCEDURE — 6370000000 HC RX 637 (ALT 250 FOR IP): Performed by: THORACIC SURGERY (CARDIOTHORACIC VASCULAR SURGERY)

## 2025-02-07 PROCEDURE — 2500000003 HC RX 250 WO HCPCS: Performed by: PHYSICIAN ASSISTANT

## 2025-02-07 PROCEDURE — 85730 THROMBOPLASTIN TIME PARTIAL: CPT

## 2025-02-07 PROCEDURE — 85610 PROTHROMBIN TIME: CPT

## 2025-02-07 PROCEDURE — 94669 MECHANICAL CHEST WALL OSCILL: CPT

## 2025-02-07 PROCEDURE — 6360000002 HC RX W HCPCS: Performed by: PHYSICIAN ASSISTANT

## 2025-02-07 PROCEDURE — 83735 ASSAY OF MAGNESIUM: CPT

## 2025-02-07 PROCEDURE — 85027 COMPLETE CBC AUTOMATED: CPT

## 2025-02-07 PROCEDURE — 97116 GAIT TRAINING THERAPY: CPT

## 2025-02-07 PROCEDURE — 94150 VITAL CAPACITY TEST: CPT

## 2025-02-07 PROCEDURE — 94664 DEMO&/EVAL PT USE INHALER: CPT

## 2025-02-07 PROCEDURE — 94761 N-INVAS EAR/PLS OXIMETRY MLT: CPT

## 2025-02-07 PROCEDURE — 97530 THERAPEUTIC ACTIVITIES: CPT

## 2025-02-07 PROCEDURE — 84100 ASSAY OF PHOSPHORUS: CPT

## 2025-02-07 PROCEDURE — 99233 SBSQ HOSP IP/OBS HIGH 50: CPT | Performed by: INTERNAL MEDICINE

## 2025-02-07 PROCEDURE — 6370000000 HC RX 637 (ALT 250 FOR IP): Performed by: SPECIALIST

## 2025-02-07 PROCEDURE — 80048 BASIC METABOLIC PNL TOTAL CA: CPT

## 2025-02-07 PROCEDURE — 93005 ELECTROCARDIOGRAM TRACING: CPT | Performed by: INTERNAL MEDICINE

## 2025-02-07 PROCEDURE — 94640 AIRWAY INHALATION TREATMENT: CPT

## 2025-02-07 PROCEDURE — 71045 X-RAY EXAM CHEST 1 VIEW: CPT

## 2025-02-07 RX ORDER — METOPROLOL TARTRATE 50 MG
50 TABLET ORAL 2 TIMES DAILY
Status: DISCONTINUED | OUTPATIENT
Start: 2025-02-07 | End: 2025-02-07

## 2025-02-07 RX ORDER — FUROSEMIDE 10 MG/ML
40 INJECTION INTRAMUSCULAR; INTRAVENOUS DAILY
Status: DISCONTINUED | OUTPATIENT
Start: 2025-02-07 | End: 2025-02-09 | Stop reason: HOSPADM

## 2025-02-07 RX ORDER — METOPROLOL TARTRATE 25 MG/1
25 TABLET, FILM COATED ORAL 2 TIMES DAILY
Status: DISCONTINUED | OUTPATIENT
Start: 2025-02-07 | End: 2025-02-07

## 2025-02-07 RX ORDER — POTASSIUM CHLORIDE 1500 MG/1
20 TABLET, EXTENDED RELEASE ORAL DAILY
Status: DISCONTINUED | OUTPATIENT
Start: 2025-02-07 | End: 2025-02-09 | Stop reason: HOSPADM

## 2025-02-07 RX ORDER — METOPROLOL TARTRATE 25 MG/1
37.5 TABLET, FILM COATED ORAL 2 TIMES DAILY
Status: DISCONTINUED | OUTPATIENT
Start: 2025-02-07 | End: 2025-02-08

## 2025-02-07 RX ORDER — METOPROLOL SUCCINATE 25 MG/1
25 TABLET, EXTENDED RELEASE ORAL DAILY
Status: DISCONTINUED | OUTPATIENT
Start: 2025-02-07 | End: 2025-02-07

## 2025-02-07 RX ADMIN — TRAMADOL HYDROCHLORIDE 50 MG: 50 TABLET, COATED ORAL at 09:30

## 2025-02-07 RX ADMIN — METOPROLOL SUCCINATE 25 MG: 25 TABLET, EXTENDED RELEASE ORAL at 08:16

## 2025-02-07 RX ADMIN — GABAPENTIN 300 MG: 300 CAPSULE ORAL at 20:30

## 2025-02-07 RX ADMIN — BACITRACIN: 500 OINTMENT TOPICAL at 20:33

## 2025-02-07 RX ADMIN — MUPIROCIN: 20 OINTMENT TOPICAL at 08:21

## 2025-02-07 RX ADMIN — CLOPIDOGREL BISULFATE 75 MG: 75 TABLET ORAL at 08:16

## 2025-02-07 RX ADMIN — CHLORHEXIDINE GLUCONATE 0.12% ORAL RINSE 15 ML: 1.2 LIQUID ORAL at 20:29

## 2025-02-07 RX ADMIN — METHOCARBAMOL 500 MG: 500 TABLET ORAL at 08:15

## 2025-02-07 RX ADMIN — ACETAMINOPHEN 1000 MG: 500 TABLET ORAL at 00:11

## 2025-02-07 RX ADMIN — SODIUM PHOSPHATE, MONOBASIC, MONOHYDRATE AND SODIUM PHOSPHATE, DIBASIC, ANHYDROUS 15 MMOL: 142; 276 INJECTION, SOLUTION INTRAVENOUS at 17:04

## 2025-02-07 RX ADMIN — SENNOSIDES AND DOCUSATE SODIUM 1 TABLET: 50; 8.6 TABLET ORAL at 08:15

## 2025-02-07 RX ADMIN — FAMOTIDINE 20 MG: 20 TABLET, FILM COATED ORAL at 20:29

## 2025-02-07 RX ADMIN — BACITRACIN: 500 OINTMENT TOPICAL at 08:21

## 2025-02-07 RX ADMIN — CHLORHEXIDINE GLUCONATE 0.12% ORAL RINSE 15 ML: 1.2 LIQUID ORAL at 08:14

## 2025-02-07 RX ADMIN — HEPARIN SODIUM 5000 UNITS: 5000 INJECTION INTRAVENOUS; SUBCUTANEOUS at 15:00

## 2025-02-07 RX ADMIN — METHOCARBAMOL 500 MG: 500 TABLET ORAL at 20:29

## 2025-02-07 RX ADMIN — CALCIUM CHLORIDE 1000 MG: 100 INJECTION, SOLUTION INTRAVENOUS at 17:08

## 2025-02-07 RX ADMIN — IPRATROPIUM BROMIDE AND ALBUTEROL SULFATE 1 DOSE: .5; 2.5 SOLUTION RESPIRATORY (INHALATION) at 15:51

## 2025-02-07 RX ADMIN — ASPIRIN 81 MG: 81 TABLET, CHEWABLE ORAL at 08:15

## 2025-02-07 RX ADMIN — FAMOTIDINE 20 MG: 20 TABLET, FILM COATED ORAL at 08:16

## 2025-02-07 RX ADMIN — IPRATROPIUM BROMIDE AND ALBUTEROL SULFATE 1 DOSE: .5; 2.5 SOLUTION RESPIRATORY (INHALATION) at 11:37

## 2025-02-07 RX ADMIN — SENNOSIDES AND DOCUSATE SODIUM 1 TABLET: 50; 8.6 TABLET ORAL at 20:28

## 2025-02-07 RX ADMIN — MUPIROCIN: 20 OINTMENT TOPICAL at 20:34

## 2025-02-07 RX ADMIN — GABAPENTIN 300 MG: 300 CAPSULE ORAL at 08:14

## 2025-02-07 RX ADMIN — METOPROLOL TARTRATE 25 MG: 25 TABLET, FILM COATED ORAL at 08:56

## 2025-02-07 RX ADMIN — SODIUM CHLORIDE, PRESERVATIVE FREE 10 ML: 5 INJECTION INTRAVENOUS at 08:20

## 2025-02-07 RX ADMIN — ACETAMINOPHEN 1000 MG: 500 TABLET ORAL at 06:13

## 2025-02-07 RX ADMIN — PRAVASTATIN SODIUM 10 MG: 20 TABLET ORAL at 20:34

## 2025-02-07 RX ADMIN — GABAPENTIN 300 MG: 300 CAPSULE ORAL at 15:00

## 2025-02-07 RX ADMIN — ACETAMINOPHEN 1000 MG: 500 TABLET ORAL at 18:35

## 2025-02-07 RX ADMIN — GUAIFENESIN 1200 MG: 600 TABLET ORAL at 20:28

## 2025-02-07 RX ADMIN — GUAIFENESIN 1200 MG: 600 TABLET ORAL at 08:16

## 2025-02-07 RX ADMIN — LATANOPROST 1 DROP: 50 SOLUTION OPHTHALMIC at 20:34

## 2025-02-07 RX ADMIN — FUROSEMIDE 40 MG: 10 INJECTION, SOLUTION INTRAMUSCULAR; INTRAVENOUS at 09:34

## 2025-02-07 RX ADMIN — SODIUM CHLORIDE, PRESERVATIVE FREE 10 ML: 5 INJECTION INTRAVENOUS at 20:30

## 2025-02-07 RX ADMIN — POTASSIUM BICARBONATE 20 MEQ: 782 TABLET, EFFERVESCENT ORAL at 16:10

## 2025-02-07 RX ADMIN — HEPARIN SODIUM 5000 UNITS: 5000 INJECTION INTRAVENOUS; SUBCUTANEOUS at 06:13

## 2025-02-07 RX ADMIN — SODIUM PHOSPHATE, MONOBASIC, MONOHYDRATE AND SODIUM PHOSPHATE, DIBASIC, ANHYDROUS 15 MMOL: 142; 276 INJECTION, SOLUTION INTRAVENOUS at 09:03

## 2025-02-07 RX ADMIN — METHOCARBAMOL 500 MG: 500 TABLET ORAL at 15:00

## 2025-02-07 RX ADMIN — POTASSIUM CHLORIDE 20 MEQ: 1500 TABLET, EXTENDED RELEASE ORAL at 09:33

## 2025-02-07 RX ADMIN — INSULIN LISPRO 1 UNITS: 100 INJECTION, SOLUTION INTRAVENOUS; SUBCUTANEOUS at 17:12

## 2025-02-07 RX ADMIN — POTASSIUM CHLORIDE 20 MEQ: 1500 TABLET, EXTENDED RELEASE ORAL at 07:39

## 2025-02-07 RX ADMIN — METOPROLOL TARTRATE 37.5 MG: 25 TABLET, FILM COATED ORAL at 20:29

## 2025-02-07 RX ADMIN — Medication 1 TABLET: at 08:16

## 2025-02-07 RX ADMIN — IPRATROPIUM BROMIDE AND ALBUTEROL SULFATE 1 DOSE: .5; 2.5 SOLUTION RESPIRATORY (INHALATION) at 20:36

## 2025-02-07 RX ADMIN — ACETAMINOPHEN 1000 MG: 500 TABLET ORAL at 13:09

## 2025-02-07 RX ADMIN — POLYETHYLENE GLYCOL (3350) 17 G: 17 POWDER, FOR SOLUTION ORAL at 08:14

## 2025-02-07 RX ADMIN — IPRATROPIUM BROMIDE AND ALBUTEROL SULFATE 1 DOSE: .5; 2.5 SOLUTION RESPIRATORY (INHALATION) at 07:42

## 2025-02-07 RX ADMIN — HEPARIN SODIUM 5000 UNITS: 5000 INJECTION INTRAVENOUS; SUBCUTANEOUS at 20:29

## 2025-02-07 ASSESSMENT — PAIN - FUNCTIONAL ASSESSMENT
PAIN_FUNCTIONAL_ASSESSMENT: ACTIVITIES ARE NOT PREVENTED
PAIN_FUNCTIONAL_ASSESSMENT: ACTIVITIES ARE NOT PREVENTED

## 2025-02-07 ASSESSMENT — PAIN SCALES - GENERAL
PAINLEVEL_OUTOF10: 4
PAINLEVEL_OUTOF10: 0
PAINLEVEL_OUTOF10: 2

## 2025-02-07 ASSESSMENT — PAIN DESCRIPTION - ORIENTATION
ORIENTATION: MID
ORIENTATION: MID

## 2025-02-07 ASSESSMENT — PAIN SCALES - WONG BAKER: WONGBAKER_NUMERICALRESPONSE: NO HURT

## 2025-02-07 ASSESSMENT — PAIN DESCRIPTION - LOCATION
LOCATION: CHEST
LOCATION: CHEST

## 2025-02-07 ASSESSMENT — PAIN DESCRIPTION - DESCRIPTORS
DESCRIPTORS: DISCOMFORT
DESCRIPTORS: ACHING

## 2025-02-07 ASSESSMENT — PAIN DESCRIPTION - ONSET: ONSET: ON-GOING

## 2025-02-07 ASSESSMENT — PAIN DESCRIPTION - PAIN TYPE: TYPE: SURGICAL PAIN

## 2025-02-07 ASSESSMENT — PAIN DESCRIPTION - FREQUENCY: FREQUENCY: INTERMITTENT

## 2025-02-08 ENCOUNTER — APPOINTMENT (OUTPATIENT)
Dept: GENERAL RADIOLOGY | Age: 65
DRG: 233 | End: 2025-02-08
Attending: THORACIC SURGERY (CARDIOTHORACIC VASCULAR SURGERY)
Payer: MEDICARE

## 2025-02-08 LAB
ANION GAP SERPL CALCULATED.3IONS-SCNC: 11 MMOL/L (ref 9–17)
BUN SERPL-MCNC: 12 MG/DL (ref 7–20)
CA-I BLD-SCNC: 1.21 MMOL/L (ref 1.15–1.33)
CALCIUM SERPL-MCNC: 9.5 MG/DL (ref 8.3–10.6)
CHLORIDE SERPL-SCNC: 103 MMOL/L (ref 99–110)
CO2 SERPL-SCNC: 24 MMOL/L (ref 21–32)
CREAT SERPL-MCNC: 1 MG/DL (ref 0.8–1.3)
ERYTHROCYTE [DISTWIDTH] IN BLOOD BY AUTOMATED COUNT: 13.7 % (ref 11.7–14.9)
FIBRINOGEN PPP-MCNC: 629 MG/DL (ref 170–540)
GFR, ESTIMATED: 74 ML/MIN/1.73M2
GLUCOSE BLD-MCNC: 100 MG/DL (ref 74–99)
GLUCOSE BLD-MCNC: 121 MG/DL (ref 74–99)
GLUCOSE BLD-MCNC: 122 MG/DL (ref 74–99)
GLUCOSE BLD-MCNC: 131 MG/DL (ref 74–99)
GLUCOSE SERPL-MCNC: 118 MG/DL (ref 74–99)
HCT VFR BLD AUTO: 43.4 % (ref 42–52)
HGB BLD-MCNC: 14.2 G/DL (ref 13.5–18)
INR PPP: 1.1
MAGNESIUM SERPL-MCNC: 2 MG/DL (ref 1.8–2.4)
MCH RBC QN AUTO: 30.7 PG (ref 27–31)
MCHC RBC AUTO-ENTMCNC: 32.7 G/DL (ref 32–36)
MCV RBC AUTO: 93.9 FL (ref 78–100)
PARTIAL THROMBOPLASTIN TIME: 29.6 SEC (ref 25.1–37.1)
PHOSPHATE SERPL-MCNC: 2 MG/DL (ref 2.5–4.9)
PLATELET # BLD AUTO: 160 K/UL (ref 140–440)
PMV BLD AUTO: 10.4 FL (ref 7.5–11.1)
POTASSIUM SERPL-SCNC: 4.7 MMOL/L (ref 3.5–5.1)
PROTHROMBIN TIME: 14.5 SEC (ref 11.7–14.5)
RBC # BLD AUTO: 4.62 M/UL (ref 4.6–6.2)
SODIUM SERPL-SCNC: 138 MMOL/L (ref 136–145)
WBC OTHER # BLD: 8.3 K/UL (ref 4–10.5)

## 2025-02-08 PROCEDURE — 36415 COLL VENOUS BLD VENIPUNCTURE: CPT

## 2025-02-08 PROCEDURE — 83735 ASSAY OF MAGNESIUM: CPT

## 2025-02-08 PROCEDURE — 2500000003 HC RX 250 WO HCPCS: Performed by: PHYSICIAN ASSISTANT

## 2025-02-08 PROCEDURE — 94150 VITAL CAPACITY TEST: CPT

## 2025-02-08 PROCEDURE — 85610 PROTHROMBIN TIME: CPT

## 2025-02-08 PROCEDURE — 6370000000 HC RX 637 (ALT 250 FOR IP): Performed by: PHYSICIAN ASSISTANT

## 2025-02-08 PROCEDURE — 99233 SBSQ HOSP IP/OBS HIGH 50: CPT | Performed by: INTERNAL MEDICINE

## 2025-02-08 PROCEDURE — 82330 ASSAY OF CALCIUM: CPT

## 2025-02-08 PROCEDURE — 85027 COMPLETE CBC AUTOMATED: CPT

## 2025-02-08 PROCEDURE — 6370000000 HC RX 637 (ALT 250 FOR IP): Performed by: NURSE PRACTITIONER

## 2025-02-08 PROCEDURE — 97530 THERAPEUTIC ACTIVITIES: CPT

## 2025-02-08 PROCEDURE — 6360000002 HC RX W HCPCS: Performed by: PHYSICIAN ASSISTANT

## 2025-02-08 PROCEDURE — 80048 BASIC METABOLIC PNL TOTAL CA: CPT

## 2025-02-08 PROCEDURE — 2580000003 HC RX 258: Performed by: PHYSICIAN ASSISTANT

## 2025-02-08 PROCEDURE — 84100 ASSAY OF PHOSPHORUS: CPT

## 2025-02-08 PROCEDURE — 97110 THERAPEUTIC EXERCISES: CPT

## 2025-02-08 PROCEDURE — 2100000000 HC CCU R&B

## 2025-02-08 PROCEDURE — 82962 GLUCOSE BLOOD TEST: CPT

## 2025-02-08 PROCEDURE — 85384 FIBRINOGEN ACTIVITY: CPT

## 2025-02-08 PROCEDURE — 85730 THROMBOPLASTIN TIME PARTIAL: CPT

## 2025-02-08 PROCEDURE — 97116 GAIT TRAINING THERAPY: CPT

## 2025-02-08 PROCEDURE — 94761 N-INVAS EAR/PLS OXIMETRY MLT: CPT

## 2025-02-08 PROCEDURE — 71045 X-RAY EXAM CHEST 1 VIEW: CPT

## 2025-02-08 RX ORDER — METOPROLOL TARTRATE 50 MG
50 TABLET ORAL 2 TIMES DAILY
Status: DISCONTINUED | OUTPATIENT
Start: 2025-02-08 | End: 2025-02-09 | Stop reason: HOSPADM

## 2025-02-08 RX ORDER — IPRATROPIUM BROMIDE AND ALBUTEROL SULFATE 2.5; .5 MG/3ML; MG/3ML
1 SOLUTION RESPIRATORY (INHALATION) EVERY 4 HOURS PRN
Status: DISCONTINUED | OUTPATIENT
Start: 2025-02-08 | End: 2025-02-09 | Stop reason: HOSPADM

## 2025-02-08 RX ADMIN — METOPROLOL TARTRATE 50 MG: 50 TABLET, FILM COATED ORAL at 21:24

## 2025-02-08 RX ADMIN — GUAIFENESIN 1200 MG: 600 TABLET ORAL at 21:23

## 2025-02-08 RX ADMIN — SODIUM PHOSPHATE, MONOBASIC, MONOHYDRATE AND SODIUM PHOSPHATE, DIBASIC, ANHYDROUS 15 MMOL: 142; 276 INJECTION, SOLUTION INTRAVENOUS at 07:42

## 2025-02-08 RX ADMIN — CLOPIDOGREL BISULFATE 75 MG: 75 TABLET ORAL at 08:01

## 2025-02-08 RX ADMIN — METHOCARBAMOL 500 MG: 500 TABLET ORAL at 08:00

## 2025-02-08 RX ADMIN — SODIUM CHLORIDE, PRESERVATIVE FREE 10 ML: 5 INJECTION INTRAVENOUS at 08:01

## 2025-02-08 RX ADMIN — BACITRACIN: 500 OINTMENT TOPICAL at 08:02

## 2025-02-08 RX ADMIN — HEPARIN SODIUM 5000 UNITS: 5000 INJECTION INTRAVENOUS; SUBCUTANEOUS at 21:23

## 2025-02-08 RX ADMIN — CHLORHEXIDINE GLUCONATE 0.12% ORAL RINSE 15 ML: 1.2 LIQUID ORAL at 21:22

## 2025-02-08 RX ADMIN — GABAPENTIN 300 MG: 300 CAPSULE ORAL at 21:48

## 2025-02-08 RX ADMIN — HEPARIN SODIUM 5000 UNITS: 5000 INJECTION INTRAVENOUS; SUBCUTANEOUS at 13:24

## 2025-02-08 RX ADMIN — CHLORHEXIDINE GLUCONATE 0.12% ORAL RINSE 15 ML: 1.2 LIQUID ORAL at 08:00

## 2025-02-08 RX ADMIN — Medication 1 TABLET: at 08:01

## 2025-02-08 RX ADMIN — HEPARIN SODIUM 5000 UNITS: 5000 INJECTION INTRAVENOUS; SUBCUTANEOUS at 05:31

## 2025-02-08 RX ADMIN — SODIUM CHLORIDE, PRESERVATIVE FREE 10 ML: 5 INJECTION INTRAVENOUS at 21:00

## 2025-02-08 RX ADMIN — BACITRACIN: 500 OINTMENT TOPICAL at 21:49

## 2025-02-08 RX ADMIN — GUAIFENESIN 1200 MG: 600 TABLET ORAL at 08:01

## 2025-02-08 RX ADMIN — ACETAMINOPHEN 1000 MG: 500 TABLET ORAL at 05:31

## 2025-02-08 RX ADMIN — GABAPENTIN 300 MG: 300 CAPSULE ORAL at 13:24

## 2025-02-08 RX ADMIN — METHOCARBAMOL 500 MG: 500 TABLET ORAL at 21:24

## 2025-02-08 RX ADMIN — METHOCARBAMOL 500 MG: 500 TABLET ORAL at 13:24

## 2025-02-08 RX ADMIN — MUPIROCIN: 20 OINTMENT TOPICAL at 08:02

## 2025-02-08 RX ADMIN — FUROSEMIDE 40 MG: 10 INJECTION, SOLUTION INTRAMUSCULAR; INTRAVENOUS at 08:01

## 2025-02-08 RX ADMIN — POTASSIUM CHLORIDE 20 MEQ: 1500 TABLET, EXTENDED RELEASE ORAL at 08:05

## 2025-02-08 RX ADMIN — FAMOTIDINE 20 MG: 20 TABLET, FILM COATED ORAL at 21:23

## 2025-02-08 RX ADMIN — MUPIROCIN: 20 OINTMENT TOPICAL at 21:23

## 2025-02-08 RX ADMIN — FAMOTIDINE 20 MG: 20 TABLET, FILM COATED ORAL at 08:01

## 2025-02-08 RX ADMIN — ASPIRIN 81 MG: 81 TABLET, CHEWABLE ORAL at 08:01

## 2025-02-08 RX ADMIN — GABAPENTIN 300 MG: 300 CAPSULE ORAL at 08:01

## 2025-02-08 RX ADMIN — ACETAMINOPHEN 1000 MG: 500 TABLET ORAL at 11:49

## 2025-02-08 RX ADMIN — METOPROLOL TARTRATE 37.5 MG: 25 TABLET, FILM COATED ORAL at 08:00

## 2025-02-08 ASSESSMENT — PAIN SCALES - GENERAL
PAINLEVEL_OUTOF10: 0

## 2025-02-08 ASSESSMENT — PAIN SCALES - WONG BAKER
WONGBAKER_NUMERICALRESPONSE: NO HURT
WONGBAKER_NUMERICALRESPONSE: NO HURT

## 2025-02-08 NOTE — PLAN OF CARE
Problem: Discharge Planning  Goal: Discharge to home or other facility with appropriate resources  2/8/2025 0902 by Vincent Brooks RN  Outcome: Progressing  2/7/2025 2142 by Niurka Salgado RN  Outcome: Progressing     Problem: Pain  Goal: Verbalizes/displays adequate comfort level or baseline comfort level  2/8/2025 0902 by Vincent Brooks RN  Outcome: Progressing  2/7/2025 2142 by Niurka Salgado RN  Outcome: Progressing     Problem: Safety - Adult  Goal: Free from fall injury  2/8/2025 0902 by Vincent Brooks RN  Outcome: Progressing  2/7/2025 2142 by Niurka Salgado RN  Outcome: Progressing

## 2025-02-08 NOTE — CARE COORDINATION
Cm received consult that stated \"other\" as the reason. Cm reviewed chart, pt already has discharge planning initiated. Cm cleared consult. Please provide consults with specific reasons in the future. Cm to remain available if any specific needs arise.

## 2025-02-09 ENCOUNTER — APPOINTMENT (OUTPATIENT)
Dept: GENERAL RADIOLOGY | Age: 65
DRG: 233 | End: 2025-02-09
Attending: THORACIC SURGERY (CARDIOTHORACIC VASCULAR SURGERY)
Payer: MEDICARE

## 2025-02-09 VITALS
WEIGHT: 213 LBS | BODY MASS INDEX: 30.49 KG/M2 | RESPIRATION RATE: 20 BRPM | TEMPERATURE: 98.5 F | OXYGEN SATURATION: 96 % | DIASTOLIC BLOOD PRESSURE: 77 MMHG | HEART RATE: 65 BPM | SYSTOLIC BLOOD PRESSURE: 112 MMHG | HEIGHT: 70 IN

## 2025-02-09 LAB
ANION GAP SERPL CALCULATED.3IONS-SCNC: 11 MMOL/L (ref 9–17)
BUN SERPL-MCNC: 16 MG/DL (ref 7–20)
CA-I BLD-SCNC: 1.24 MMOL/L (ref 1.15–1.33)
CALCIUM SERPL-MCNC: 9.7 MG/DL (ref 8.3–10.6)
CHLORIDE SERPL-SCNC: 102 MMOL/L (ref 99–110)
CO2 SERPL-SCNC: 26 MMOL/L (ref 21–32)
CREAT SERPL-MCNC: 0.9 MG/DL (ref 0.8–1.3)
ERYTHROCYTE [DISTWIDTH] IN BLOOD BY AUTOMATED COUNT: 13.6 % (ref 11.7–14.9)
FIBRINOGEN PPP-MCNC: 659 MG/DL (ref 170–540)
GFR, ESTIMATED: 82 ML/MIN/1.73M2
GLUCOSE BLD-MCNC: 111 MG/DL (ref 74–99)
GLUCOSE BLD-MCNC: 86 MG/DL (ref 74–99)
GLUCOSE SERPL-MCNC: 152 MG/DL (ref 74–99)
HCT VFR BLD AUTO: 46.9 % (ref 42–52)
HGB BLD-MCNC: 15.4 G/DL (ref 13.5–18)
INR PPP: 1
MAGNESIUM SERPL-MCNC: 2.3 MG/DL (ref 1.8–2.4)
MCH RBC QN AUTO: 30.4 PG (ref 27–31)
MCHC RBC AUTO-ENTMCNC: 32.8 G/DL (ref 32–36)
MCV RBC AUTO: 92.5 FL (ref 78–100)
PARTIAL THROMBOPLASTIN TIME: 29 SEC (ref 25.1–37.1)
PHOSPHATE SERPL-MCNC: 2.4 MG/DL (ref 2.5–4.9)
PLATELET # BLD AUTO: 205 K/UL (ref 140–440)
PMV BLD AUTO: 10.6 FL (ref 7.5–11.1)
POTASSIUM SERPL-SCNC: 4.2 MMOL/L (ref 3.5–5.1)
PROTHROMBIN TIME: 13.8 SEC (ref 11.7–14.5)
RBC # BLD AUTO: 5.07 M/UL (ref 4.6–6.2)
SODIUM SERPL-SCNC: 139 MMOL/L (ref 136–145)
WBC OTHER # BLD: 6 K/UL (ref 4–10.5)

## 2025-02-09 PROCEDURE — 85027 COMPLETE CBC AUTOMATED: CPT

## 2025-02-09 PROCEDURE — 85730 THROMBOPLASTIN TIME PARTIAL: CPT

## 2025-02-09 PROCEDURE — 6370000000 HC RX 637 (ALT 250 FOR IP): Performed by: PHYSICIAN ASSISTANT

## 2025-02-09 PROCEDURE — 6360000002 HC RX W HCPCS: Performed by: PHYSICIAN ASSISTANT

## 2025-02-09 PROCEDURE — 80048 BASIC METABOLIC PNL TOTAL CA: CPT

## 2025-02-09 PROCEDURE — 82330 ASSAY OF CALCIUM: CPT

## 2025-02-09 PROCEDURE — 6370000000 HC RX 637 (ALT 250 FOR IP): Performed by: NURSE PRACTITIONER

## 2025-02-09 PROCEDURE — 97530 THERAPEUTIC ACTIVITIES: CPT

## 2025-02-09 PROCEDURE — 97110 THERAPEUTIC EXERCISES: CPT

## 2025-02-09 PROCEDURE — 84100 ASSAY OF PHOSPHORUS: CPT

## 2025-02-09 PROCEDURE — 94761 N-INVAS EAR/PLS OXIMETRY MLT: CPT

## 2025-02-09 PROCEDURE — 99232 SBSQ HOSP IP/OBS MODERATE 35: CPT | Performed by: INTERNAL MEDICINE

## 2025-02-09 PROCEDURE — 2500000003 HC RX 250 WO HCPCS: Performed by: PHYSICIAN ASSISTANT

## 2025-02-09 PROCEDURE — 85610 PROTHROMBIN TIME: CPT

## 2025-02-09 PROCEDURE — 94150 VITAL CAPACITY TEST: CPT

## 2025-02-09 PROCEDURE — 83735 ASSAY OF MAGNESIUM: CPT

## 2025-02-09 PROCEDURE — 85384 FIBRINOGEN ACTIVITY: CPT

## 2025-02-09 PROCEDURE — 82962 GLUCOSE BLOOD TEST: CPT

## 2025-02-09 PROCEDURE — 71045 X-RAY EXAM CHEST 1 VIEW: CPT

## 2025-02-09 PROCEDURE — 36415 COLL VENOUS BLD VENIPUNCTURE: CPT

## 2025-02-09 RX ORDER — TRAMADOL HYDROCHLORIDE 50 MG/1
50 TABLET ORAL EVERY 8 HOURS PRN
Qty: 21 TABLET | Refills: 0 | Status: SHIPPED | OUTPATIENT
Start: 2025-02-09 | End: 2025-02-16

## 2025-02-09 RX ORDER — METOPROLOL TARTRATE 50 MG
50 TABLET ORAL 2 TIMES DAILY
Qty: 60 TABLET | Refills: 2 | Status: SHIPPED | OUTPATIENT
Start: 2025-02-09

## 2025-02-09 RX ORDER — FUROSEMIDE 20 MG/1
20 TABLET ORAL DAILY
Qty: 30 TABLET | Refills: 0 | Status: SHIPPED | OUTPATIENT
Start: 2025-02-09 | End: 2025-02-14

## 2025-02-09 RX ADMIN — FAMOTIDINE 20 MG: 20 TABLET, FILM COATED ORAL at 08:20

## 2025-02-09 RX ADMIN — ACETAMINOPHEN 1000 MG: 500 TABLET ORAL at 06:45

## 2025-02-09 RX ADMIN — SODIUM CHLORIDE, PRESERVATIVE FREE 10 ML: 5 INJECTION INTRAVENOUS at 08:19

## 2025-02-09 RX ADMIN — ACETAMINOPHEN 1000 MG: 500 TABLET ORAL at 01:13

## 2025-02-09 RX ADMIN — LATANOPROST 1 DROP: 50 SOLUTION OPHTHALMIC at 01:13

## 2025-02-09 RX ADMIN — FUROSEMIDE 40 MG: 10 INJECTION, SOLUTION INTRAMUSCULAR; INTRAVENOUS at 08:21

## 2025-02-09 RX ADMIN — METOPROLOL TARTRATE 50 MG: 50 TABLET, FILM COATED ORAL at 08:20

## 2025-02-09 RX ADMIN — GUAIFENESIN 1200 MG: 600 TABLET ORAL at 08:20

## 2025-02-09 RX ADMIN — CHLORHEXIDINE GLUCONATE 0.12% ORAL RINSE 15 ML: 1.2 LIQUID ORAL at 08:19

## 2025-02-09 RX ADMIN — Medication 1 TABLET: at 08:20

## 2025-02-09 RX ADMIN — PRAVASTATIN SODIUM 10 MG: 20 TABLET ORAL at 01:13

## 2025-02-09 RX ADMIN — BACITRACIN: 500 OINTMENT TOPICAL at 08:19

## 2025-02-09 RX ADMIN — POTASSIUM CHLORIDE 20 MEQ: 1500 TABLET, EXTENDED RELEASE ORAL at 08:20

## 2025-02-09 RX ADMIN — HEPARIN SODIUM 5000 UNITS: 5000 INJECTION INTRAVENOUS; SUBCUTANEOUS at 06:45

## 2025-02-09 RX ADMIN — ASPIRIN 81 MG: 81 TABLET, CHEWABLE ORAL at 08:20

## 2025-02-09 RX ADMIN — GABAPENTIN 300 MG: 300 CAPSULE ORAL at 08:19

## 2025-02-09 RX ADMIN — MUPIROCIN: 20 OINTMENT TOPICAL at 08:19

## 2025-02-09 RX ADMIN — CLOPIDOGREL BISULFATE 75 MG: 75 TABLET ORAL at 08:20

## 2025-02-09 RX ADMIN — METHOCARBAMOL 500 MG: 500 TABLET ORAL at 08:20

## 2025-02-09 ASSESSMENT — PAIN SCALES - GENERAL
PAINLEVEL_OUTOF10: 0
PAINLEVEL_OUTOF10: 0

## 2025-02-09 NOTE — DISCHARGE SUMMARY
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    Hospital Course:   Mr. Kian Frias is a 64 year old male that underwent CABG with Left internal mammary artery graft to the mid left anterior descending coronary artery, saphenous vein bypass graft from the aorta to PDA coronary artery (two distal anastamosis) using cardiopulmonary bypass, mild hypothermia, cold cardioplegia with Dr. Robert Cruz on 2/5/25. He did well post operatively and was extubated overnight. There was concern for ST segment elevation and was taken urgently for cardiac catheterization which demonstrated patent grafts. Chest tubes were removed without incident. He was initiated on Plavix on post operative day 2. He progressed with PT and OT, was weaned off oxygen, and discharged to home on 02/09/25 . Epicardial wires removed prior to discharge.       VS at discharge   Vitals:    02/09/25 0800   BP: 124/85   Pulse: 95   Resp: 29   Temp: 98.5 °F (36.9 °C)   SpO2: 98%       Consults: cardiology    Disposition: home    Patient Instructions:      Medication List        START taking these medications      furosemide 20 MG tablet  Commonly known as: Lasix  Take 1 tablet by mouth daily for 5 days Take for 5 days. Will re-evaluate continuing at follow up

## 2025-02-09 NOTE — PLAN OF CARE
Problem: Discharge Planning  Goal: Discharge to home or other facility with appropriate resources  2/9/2025 0856 by Brooke Bales RN  Outcome: Progressing  2/9/2025 0413 by Romi Christine RN  Outcome: Progressing     Problem: Pain  Goal: Verbalizes/displays adequate comfort level or baseline comfort level  2/9/2025 0856 by Brooke Bales RN  Outcome: Progressing  2/9/2025 0413 by Romi Christine RN  Outcome: Progressing     Problem: Safety - Adult  Goal: Free from fall injury  2/9/2025 0856 by Brooke Bales RN  Outcome: Progressing  2/9/2025 0413 by Romi Christine RN  Outcome: Progressing     Problem: Safety - Medical Restraint  Goal: Remains free of injury from restraints (Restraint for Interference with Medical Device)  Description: INTERVENTIONS:  1. Determine that other, less restrictive measures have been tried or would not be effective before applying the restraint  2. Evaluate the patient's condition at the time of restraint application  3. Inform patient/family regarding the reason for restraint  4. Q2H: Monitor safety, psychosocial status, comfort, nutrition and hydration  2/9/2025 0856 by Brooke Bales RN  Outcome: Progressing  2/9/2025 0413 by Romi Christine RN  Outcome: Progressing

## 2025-02-09 NOTE — PROGRESS NOTES
CARDIOLOGY  NOTE        Name:  Kian Frias /Age/Sex: 1960  (64 y.o. male)   MRN & CSN:  [id number] & [id number] Admission Date/Time: 2025  7:39 AM   Location:  -A PCP: Rajeev Merida MD       Hospital Day: 4        PLAN FROM CARDIOLOGY FOR TODAY:   Continue present medical therapy patient is status post CABG cardiac cath done 2 which showed that the bypasses were patent there was no area of intervention ST elevation was probably secondary to compression from the mediastinal tube      - cardiology consult is for: Status post CABG    -  Interval history: Has incisional pain    ASSESSMENT/ PLAN:      Status post CABG stable had LIMA to the LAD and SVG to PDA that was done on 2025 and ST elevation had a cath done which showed bypasses were patent  Risk factor modification  Will continue with Pravachol  Also has history of SVT has not had any issues we will continue to monitor  Postop echo was unremarkable  Was on metformin as well will continue to monitor the blood glucose level           Subjective:      Todays complain: Patient incisional pain    HPI:  Kian is a 64 y.o.year old who and presents with had no chief complaint listed for this encounter.  No chief complaint on file.          Objective: Temperature:  Current - Temp: 98.6 °F (37 °C); Max - Temp  Av.1 °F (37.3 °C)  Min: 98.4 °F (36.9 °C)  Max: 100.6 °F (38.1 °C)    Respiratory Rate : Resp  Av.7  Min: 19  Max: 31    Pulse Range: Pulse  Av.3  Min: 74  Max: 106    Blood Presuure Range:  Systolic (24hrs), Av , Min:102 , Max:146   ; Diastolic (24hrs), Av, Min:67, Max:100      Pulse ox Range: SpO2  Av.9 %  Min: 84 %  Max: 96 %    24hr I & O:    Intake/Output Summary (Last 24 hours) at 2025 1022  Last data filed at 2025 0938  Gross per 24 hour   Intake 2073.6 ml   Output 5520 ml   Net -3446.4 ml         BP (!) 142/93   Pulse 80   
       MEDICAL DECISION MAKING:    Assessment:  1. Coronary Artery Disease, status post CABG (ICD-10: I25.10, CPT: 17913)  2. Post-operative care, requiring critical care management (CPT: 75573)  3. Preventive care counseling (ICD-10: Z71.89, CPT: 27649)    Plan:   Continue post-CABG management with weaning trials for extubation anticipated tonight   Maintain chest tube management per cardiothoracic surgery protocol   Continue Ang-Synephrine as needed for hemodynamic support   Critical care time documented: 35 minutes of direct patient care including:    - Bedside examinations    - Medical management    - IV pressor titration    - Consultant coordination    - Diagnostic test review   Provide health maintenance counseling regarding exercise and diet modifications   Continue all current medications as prescribed     
   02/05/25 1324   Patient Observation   Pulse 80   Respirations 16   SpO2 99 %   Breath Sounds   Respiratory Pattern Regular   Breath Sounds Bilateral Diminished   Vent Information   Ventilator ID [id number]   Ventilator Initiate Yes   Vent Mode AC/VC   $Ventilation $Initial Day   Ventilator Settings   Vt (Set, mL) 550 mL   Resp Rate (Set) 16 bpm   PEEP/CPAP (cmH2O) 5   FiO2  60 %   Vent Patient Data (Readings)   Vt (Measured) 525 mL   Peak Inspiratory Pressure (cmH2O) 19 cmH2O   Rate Measured 16 br/min   Minute Volume (L/min) 8.37 Liters   Mean Airway Pressure (cmH2O) 9.6 cmH20   Plateau Pressure (cm H2O) 0 cm H2O   Driving Pressure -5   I:E Ratio 1:1.80   Flow Sensitivity 3 L/min   Backup Apnea On   Backup Rate 16 Breaths Per Minute   Backup Vt 550   Vent Alarm Settings   High Pressure (cmH2O) 40 cmH2O   Low Minute Volume (lpm) 2.5 L/min   High Minute Volume (lpm) 20 L/min   Low Exhaled Vt (ml) 250 mL   High Exhaled Vt (ml) 800 mL   RR High (bpm) 40 br/min   Apnea (secs) 20 secs   Additional Respiratoray Assessments   Ambu Bag With Mask At Bedside Yes   ETT    Placement Date/Time: 02/05/25 1013   Placed By: In surgery  Placement Verified By: Capnometry  Preoxygenation: Yes  Mask Ventilation: Ventilated by mask (1)  Technique: Direct laryngoscopy  Airway Type: Cuffed  Airway Tube Size: 8 mm  Laryngoscope: Mc...   $ Intubation Emergent  $ Yes   Secured At 23 cm   Measured From Lips   ETT Placement Right   Secured By Commercial tube durham   Site Assessment Dry       
   Today's plan: Patient has elevation of the EKG case discussed over Aretha certainly stat arrangement for the heart cath performed patient patient 2 out of 2 grafts circumflex is moderate disease no intervention required to get echo to follow-up      Admit Date:  2/5/2025    Subjective: Okay      Chief complaints on admission post bypass        History of present illness:Kian is a 64 y.o.year old who  presents with post bypass      Past medical history:    has a past medical history of Coronary artery disease, Heart murmur, Hyperlipidemia, Kidney stone, NSTEMI (non-ST elevated myocardial infarction) ([facility]), DAJUAN (obstructive sleep apnea), Sleep apnea, and SVT (supraventricular tachycardia) (Former[facility]).  Past surgical history:   has a past surgical history that includes Cystoscopy; Colonoscopy (2009); Cystocopy (N/A, 08/03/2017); Cardiac procedure (N/A, 1/15/2025); and Coronary artery bypass graft (N/A, 2/5/2025).  Social History:   reports that he quit smoking about 18 years ago. His smoking use included cigarettes. He started smoking about 48 years ago. He has a 30 pack-year smoking history. He has never used smokeless tobacco. He reports current alcohol use of about 1.0 standard drink of alcohol per week. He reports that he does not use drugs.  Family history:  family history includes Diabetes in his mother; Heart Disease in his mother; High Blood Pressure in his father.    Allergies   Allergen Reactions    Zocor [Simvastatin] Other (See Comments)     Muscle pain         Objective:   /69   Pulse 69   Temp 99.9 °F (37.7 °C) (Bladder)   Resp 16   Ht 1.791 m (5' 10.5\")   Wt 99.8 kg (220 lb)   SpO2 98%   BMI 31.12 kg/m²     Intake/Output Summary (Last 24 hours) at 2/6/2025 0912  Last data filed at 2/6/2025 0900  Gross per 24 hour   Intake 3463.92 ml   Output 5724 ml   Net -2260.08 ml       TELEMETRY:      Physical Exam:  Constitutional:  Well developed, Well nourished, No acute distress, Non-toxic 
  Physician Progress Note      PATIENT:               AGNIESZKA RAMÍREZ  CSN #:                  [id number]  :                       1960  ADMIT DATE:       2025 7:39 AM  DISCH DATE:  RESPONDING  PROVIDER #:        Harris Garces DO          QUERY TEXT:    Critical Care,    Patient s/p CABG  and noted to have HR over 90, RR over 20, WBC elevation and   has hypoxia documented.  If possible, please document in progress notes and   discharge summary if you are evaluating and/or treating any of the following:    The medical record reflects the following:  Risk Factors: s/p CABG  Clinical Indicators: HR over 90 verified up to 111, RR over 20 verified up to   32, temp 100.8,  WBC 11-15  Treatment: labs, IVF, BIPAP, medical management    Thank you  Options provided:  -- SIRS of non-infectious origin with organ dysfunction of hypoxemia due   atelectasis and CABG  -- Other - I will add my own diagnosis  -- Disagree - Not applicable / Not valid  -- Disagree - Clinically unable to determine / Unknown  -- Refer to Clinical Documentation Reviewer    PROVIDER RESPONSE TEXT:    Provider disagreed with this query.  disagre    Query created by: Marta Abdul on 2025 2:38 PM      Electronically signed by:  Harris Garces DO 2025 9:03 AM          
0737: Pt has ST elevation on 12 lead. Notified Dr. Cruz. Orders to notify cardiology. Notified Dr. Liu. Dr. Liu at bedside. Orders to repeat 12 lead.     0820: Pt taken to cath lab at this time. Handoff given to MIKE Downing.  
1/29/25 - Patient called back, will be in 1/31/25 for surgery pre-testing. He will call me back later to [facility] for his surgery on 2/5/25.  
1312: pt educated on appropriate breathing exercises for chest tube removal; pt demonstrated understanding. Site cleansed with betadine; sutures removed intact.  Pleural and mediastinal chest tubes removed at this time by BRANDYN Rasmussen. Petroleum gauze and dry gauze secured by silk cloth tape. No complications, pt tolerated well; RR 20, SpO2 100%. Received verbal order with readback for CXR at 1500 per BRANDYN Rasmussen.  
1317: BRANDYN Rasmussen at bedside at this time; updated on pt's hemodynamic status. Received verbal order with readback to remove arterial line; remove ace bandage from LLE and provide incision care per BRANDYN Rasmussen. Nursing communication to follow.   
1322: L radial cleansed with CHG; pulse palpable. Arterial line removed at this time; madison pressure held for 8 minutes. Tegaderm applied at to site. No complications, pt tolerated well; /65 (81).  
1455: BMP, Mg, Phos, and TERRIE sent to laboratory at this time; KARINA George notified.  
1610: Patient following commands and able to lift head off pillow. Pt placed on spontaneous ventilator setting.    1710: Pt going apneic frequently. Notified RT. Pt placed back on full support.      
4 Eyes Skin Assessment     NAME:  Kian Frias  YOB: 1960  MEDICAL RECORD NUMBER:  [id number]    The patient is being assessed for  Admission    I agree that at least one RN has performed a thorough Head to Toe Skin Assessment on the patient. ALL assessment sites listed below have been assessed.      Areas assessed by both nurses:    Head, Face, Ears, Shoulders, Back, Chest, Arms, Elbows, Hands, Sacrum. Buttock, Coccyx, Ischium, Legs. Feet and Heels, and Under Medical Devices         Does the Patient have a Wound? No noted wound(s)       Fred Prevention initiated by RN: No  Wound Care Orders initiated by RN: No    Pressure Injury (Stage 3,4, Unstageable, DTI, NWPT, and Complex wounds) if present, place Wound referral order by RN under : No    New Ostomies, if present place, Ostomy referral order under : No     Nurse 1 eSignature: Electronically signed by Brooke Bales RN on 2/5/25 at 1:36 PM EST    **SHARE this note so that the co-signing nurse can place an eSignature**    Nurse 2 eSignature: Electronically signed by Elena Baron RN on 2/5/25 at 2:59 PM EST   
All discharge instructions reviewed with pt. Pt verbalized understanding of instructions. IV removed and dressing applied. Pt discharged with all belongings home with sister Sumaya at this time.   
Dr Flanagan  here.  Aware patient has already had hid metoprolol XL this am.  New order  for Metoprolol 25 mg .  States to give now also  
Epicardial pacing wires removed per orders without complications. Pt instructed on 2 hr bedrest.   
HR 120s, /70 after returning from ambulation with PT/OT, BRANDYN Rasmussen at bedside and aware. 12 lead completed per orders showing accelerated junctional rhythm. Orders to give 25 mg oral metoprolol now.   
[facility] Cardiothoracic Surgery  Daily Progress Note    Surgeon:  Anthony    Procedure:  s/p CORONARY ARTERY BYPASS GRAFTING X2 (LIMA-LAD, SVG-PDA); ENDOSCOPIC HARVEST OF LEFT GREATER SAPHENOUS VEIN  on 2/5/25    Subjective:    Patient was seen and examined at bedside this morning without any complaints of chest pain but ST segment elevation was noted at 4am so patient was taken to the cath lab this morning for a LHC which showed that all grafts were open and normal.    Hospital Course:    2/6/25:  ST segment elevation was noted at 4am so patient was taken to the cath lab this morning for a LHC which showed that all grafts were open and normal.      Vital Signs: /69   Pulse 69   Temp 99.9 °F (37.7 °C) (Bladder)   Resp 16   Ht 1.791 m (5' 10.5\")   Wt 99.8 kg (220 lb)   SpO2 98%   BMI 31.12 kg/m²  O2 Flow Rate (L/min): 2 L/min   Admit Weight: Weight - Scale: 96.6 kg (213 lb)       I/O's:  I/O last 3 completed shifts:  In: 3304.8 [P.O.:120; I.V.:2344.8; Blood:750; IV Piggyback:90]  Out: 5629 [Urine:4815; Blood:300; Chest Tube:514]    Data:    CBC:   Recent Labs     02/05/25  1311 02/05/25 2235 02/06/25  0430   WBC 11.0* 15.6* 15.7*   HGB 13.0* 14.2 13.7   HCT 37.6* 41.3* 41.3*   MCV 91.3 91.4 92.4    217 202     BMP:   Recent Labs     02/05/25  1311 02/05/25  1414 02/05/25  1625 02/05/25  1701 02/05/25 2114 02/05/25 2235 02/06/25  0430     --   --   --   --  144 144   K 5.2*  --  3.9  --   --  4.5 4.6   *  --   --   --   --  111* 111*   CO2 22  --   --   --   --  24 22   PHOS  --   --   --   --   --   --  3.3   BUN 7  --   --   --   --  10 11   CREATININE 0.8   < >  --    < > 0.9 1.0 0.9    < > = values in this interval not displayed.     PT/INR:   Recent Labs     02/05/25  0805 02/05/25 1311 02/06/25  0430   PROTIME 14.2 16.8* 15.8*   INR 1.1 1.3 1.2     APTT:   Recent Labs     02/05/25 1311 02/06/25  0430   APTT 31.4 29.5       Chest 
[facility] Cardiothoracic Surgery  Daily Progress Note    Surgeon:  Anthony    Procedure:  s/p CORONARY ARTERY BYPASS GRAFTING X2 (LIMA-LAD, SVG-PDA); ENDOSCOPIC HARVEST OF LEFT GREATER SAPHENOUS VEIN  on 2/5/25    Subjective:    Patient was seen and examined at bedside this morning without any complaints of chest pain but ST segment elevation was noted at 4am so patient was taken to the cath lab this morning for a LHC which showed that all grafts were open and normal.    Hospital Course:    2/6/25:  ST segment elevation was noted at 4am so patient was taken to the cath lab this morning for a LHC which showed that all grafts were open and normal.    2/7/25: Lopressor 25 mg p.o. twice daily today.  Lasix 40 IV daily with potassium 20 mEq daily.  Aspirin Plavix Lovenox started.  Pravachol 10 mg nightly.  Chest tubes removed yesterday.  Dooley and Cordis out today.  Encouraged walking and I-S use      Vital Signs: BP (!) 113/93   Pulse 91   Temp 98.8 °F (37.1 °C) (Oral)   Resp 26   Ht 1.778 m (5' 10\")   Wt 99.3 kg (219 lb)   SpO2 96%   BMI 31.42 kg/m²  O2 Flow Rate (L/min): 2 L/min   Admit Weight: Weight - Scale: 96.6 kg (213 lb)       I/O's:  I/O last 3 completed shifts:  In: 2733 [P.O.:1280; I.V.:965.9; IV Piggyback:487.1]  Out: 5726 [Urine:5726]    Data:    CBC:   Recent Labs     02/05/25  2235 02/06/25  0430 02/07/25  0409 02/08/25  0610   WBC 15.6* 15.7* 8.6 8.3   HGB 14.2 13.7 12.0* 14.2   HCT 41.3* 41.3* 36.3* 43.4   MCV 91.4 92.4 93.8 93.9    202  --  160     BMP:   Recent Labs     02/07/25  0409 02/07/25  1455 02/08/25  0610    139 138   K 4.2 4.0 4.7    107 103   CO2 23 22 24   PHOS 1.9* 1.4* 2.0*   BUN 12 14 12   CREATININE 1.0 0.9 1.0     PT/INR:   Recent Labs     02/06/25  0430 02/07/25  0409 02/08/25  0610   PROTIME 15.8* 16.8* 14.5   INR 1.2 1.3 1.1     APTT:   Recent Labs     02/06/25  0430 02/07/25  0409 02/08/25  0610   APTT 29.5 29.4 
Occupational Therapy    [facility] ACUTE CARE OCCUPATIONAL THERAPY EVALUATION    Kian Frias, 1960, 2007/2007-A, 2/6/2025    Discharge Recommendation: Home with initial 24 hour supervision/assistance    History:  [place]:  The encounter diagnosis was STEMI (ST elevation myocardial infarction) (HCC).    Subjective:  Patient states: \"They say I'm a fast walker.\"  Pain: Pt denied pain this date at rest  Communication with other providers: PT Debbi, RN Brooke  Restrictions: General Precautions, Low Fall Risk, Sternal Precautions, 2L o2, Telemetry, Pulse Ox, BP cuff, Central Line, Dooley, Bed/chair alarm    Home Setup/Prior level of function:  Social/Functional History  Lives With: Alone (pt's sister/brother-in-law will stay in the house for at least 4 weeks)  Type of Home: House  Home Layout: One level, Laundry in basement  Home Access: Stairs to enter without rails  Entrance Stairs - Number of Steps: 1  Bathroom Shower/Tub: Walk-in shower  Bathroom Toilet: Handicap height  Bathroom Equipment: Shower chair, Toilet raiser  Bathroom Accessibility: Accessible  Home Equipment: Rollator, Walker - Rolling, Lift chair  Receives Help From: Family  Prior Level of Assist for ADLs: Independent  Prior Level of Assist for Homemaking: Independent  Homemaking Responsibilities: Yes  Prior Level of Assist for Ambulation: Independent community ambulator (uses no AD)  Prior Level of Assist for Transfers: Independent  Active : Yes  Occupation: Full time employment    Examination:  Observation: Sitting in chair upon arrival. Pleasant and agreeable to evaluation. Sister present and supportive.  Vision: WFL (Glasses)  Hearing: WFL  Vitals: Stable vitals throughout session on 2L o2    Body Systems and functions:  ROM: WFL all joints in BL UEs (active shoulder flexion kept 0-90' due to sternal precautions)  Strength: WFL all major muscle groups BL UEs  Sensation: WFL (denies numbness/tingling)  Tone: 
[facility] Cardiothoracic Surgery  Daily Progress Note    Surgeon:  Anthony    Procedure:  s/p CORONARY ARTERY BYPASS GRAFTING X2 (LIMA-LAD, SVG-PDA); ENDOSCOPIC HARVEST OF LEFT GREATER SAPHENOUS VEIN  on 2/5/25    Subjective:    Patient was seen and examined at bedside this morning without any complaints of chest pain but ST segment elevation was noted at 4am so patient was taken to the cath lab this morning for a LHC which showed that all grafts were open and normal.    Hospital Course:    2/6/25:  ST segment elevation was noted at 4am so patient was taken to the cath lab this morning for a LHC which showed that all grafts were open and normal.    2/7/25: Lopressor 25 mg p.o. twice daily today.  Lasix 40 IV daily with potassium 20 mEq daily.  Aspirin Plavix Lovenox started.  Pravachol 10 mg nightly.  Chest tubes removed yesterday.  Dooley and Cordis out today.  Encouraged walking and I-S use    2/      Vital Signs: /85   Pulse 85   Temp 98.9 °F (37.2 °C) (Oral)   Resp 22   Ht 1.778 m (5' 10\")   Wt 96.6 kg (213 lb)   SpO2 94%   BMI 30.56 kg/m²  O2 Flow Rate (L/min): 0 L/min   Admit Weight: Weight - Scale: 96.6 kg (213 lb)       I/O's:  I/O last 3 completed shifts:  In: 1826.8 [P.O.:1440; IV Piggyback:386.8]  Out: 3770 [Urine:3770]    Data:    CBC:   Recent Labs     02/07/25  0409 02/08/25  0610   WBC 8.6 8.3   HGB 12.0* 14.2   HCT 36.3* 43.4   MCV 93.8 93.9   PLT  --  160     BMP:   Recent Labs     02/07/25  0409 02/07/25  1455 02/08/25  0610    139 138   K 4.2 4.0 4.7    107 103   CO2 23 22 24   PHOS 1.9* 1.4* 2.0*   BUN 12 14 12   CREATININE 1.0 0.9 1.0     PT/INR:   Recent Labs     02/07/25  0409 02/08/25  0610   PROTIME 16.8* 14.5   INR 1.3 1.1     APTT:   Recent Labs     02/07/25  0409 02/08/25  0610   APTT 29.4 29.6       Chest X-Ray:   PROCEDURE: XR CHEST PORTABLE     DATE OF EXAM:  2/8/2025 7:02     DEMOGRAPHICS: 64 years old Male    
Patient arrived to CVICU at 1304 and connected to monitor.  Chest tubes x3- 2 mediastinals and 1 pleural assessed and connected to suction.   RT at bedside to place pt on ventilator.  Report received from OR RN and anesthesia.   Labs & ABG/EPOC collected.   Drips infusing from OR: Precedex, Insulin  L radial arterial line intact and in place.    V wires connected to pacer box.     
Patient informed me he took CoQ10 and Red Yeast Rice this morning, was going to take Milk Thistle at noon. Meds were added to his list, the office  Was updated via TEAMS (patient took these medications today).    Surgery @ [facility] on 2/5/25 at 1000, arrival 0800    NOTHING TO EAT OR DRINK AFTER MIDNIGHT DAY OF SURGERY    1. Enter thru the hospital main entrance on day of surgery, check in at the Information Desk. If you arrive prior to 6:00am, enter thru the ER entrance.    2. Follow the directions as prescribed by the doctor for your procedure and medications.         Morning of surgery take: Follow the office instructions for your home medications, bring your CPAP day of surgery.         Stop vitamins, supplements and NSAIDS:  Follow the office instructions for your home medications    3. Check with your Doctor regarding stopping blood thinners and follow their instructions.    4. Do not smoke, vape or use chewing tobacco morning of surgery. Do not drink any alcoholic beverages 24 hours prior to surgery.       This includes NA Beer. No street drugs 7 days prior to surgery.    5. If you have dentures, contacts of glasses they will be removed before going to the OR; please bring a case.    6. Please bring picture ID, insurance card, paperwork from the doctor’s office (H & P, Consent, & card for implantable devices).    7. Take a shower with an antibacterial soap the night before surgery and the morning of surgery. Do not put anything on your skin      After your morning shower.    8. You will need a responsible adult to drive you home and check on you after surgery.    
Physical Therapy    Physical Therapy Treatment Note  Name: Kian Frias MRN: [id number] :   1960   Date:  2025   Admission Date: 2025 Room:  A   Restrictions/Precautions:           Sternal precautions, falls   Communication with other providers:  per nurse ok to tx  Subjective:  Patient states:  motivated and agreeable to tx  Pain:   Location, Type, Intensity (0/10 to 10/10):  no c/o pain during tx session  Objective:    Observation:  alert and oriented  Objective Measures:  vitals WNL   Treatment, including education/measures:  Sit<=>stand cga from chair and commode  Amb without assistive device cga 20' x 1, 400'  Education:  Pt given cardiac pk and was instructed in Sternal Precautions, Purpose of Exercise Program, Bj Scale and Signs and Symptoms of Exercise Intolerance per Cardiac Protocol   Pt was given Initial Cardiac Exercises in Supine:  Ankle Pumps x 10  Hip Abduction x 10  Heel Slides x 10  Shoulder Rolls x 10  Head Turns x 10  Front Arm Raises x 10   Side Arm Raises x 10  Arm Crosses x 10   Safety  Patient left safely in the chair, with call light/phone in reach. Gait belt was used for transfers and gait.   Assessment / Impression:      Patient's tolerance of treatment:  good   Adverse Reaction: na  Significant change in status and impact:  na  Barriers to improvement:  strength and safety  Plan for Next Session:    Cont. POC      Time in:  830  Time out:  930  Timed treatment minutes:  60  Total treatment time:  60    Previously filed items:  Social/Functional History  Lives With:  (pt's sister/brother in law will stay in the house for at least 4 weeks)  Type of Home: House  Home Layout: One level, Laundry in basement  Home Access: Stairs to enter without rails  Entrance Stairs - Number of Steps: 1  Bathroom Shower/Tub: Walk-in shower  Bathroom Toilet: Handicap height  Bathroom Equipment: Shower chair, Toilet raiser  Bathroom Accessibility: Accessible  Home Equipment: 
Physical Therapy    Physical Therapy Treatment Note  Name: Kian Frias MRN: [id number] :   1960   Date:  2025   Admission Date: 2025 Room:  -A   Restrictions/Precautions:          Sternal precautions, falls, morrison, central line, portable tele, wound vac, BP cuff, O2, pulse ox   Communication with other providers:  Hand off with Nurse    Subjective:  Patient states: Pt. Agreeable to work with therapy.    Pain:  (0/10 to 10/10):  Pt. Reports only having pain when taking a deep breath.   Objective:    Observation: Pt. Up in recliner upon arrival to room   Objective Measures:  A & O x4, room air.   Treatment, including education/measures:  Therapeutic Activity Training:   Therapeutic activity training was instructed today.  Cues were given for safety, sequence, UE/LE placement, awareness, and balance.    Activities performed today included sit-stand.    Bed Mobility: DNT, OOB pre/post session   Sit to stand transfer: CGA from recliner    Sitting balance:SBA, unsupported in recliner  Standing balance:CGA with FWW     Gait Training:  Cues were given for safety, sequence, device management, balance, posture, awareness, path.    Amb [id number], CGA, FWW.     Education:   Pt. Educated on importance of out of bed activity, safe functional mobility, sternal precautions and walker management.     Session requires increased time for line management.    Assessment / Impression:    Pt. Left up in recliner at end of session, all needs met, phone and call light in reach, bed/personal alarm active, and nursing updated.     Patient's tolerance of treatment:  Great   Adverse Reaction: none  Significant change in status and impact:  none  Barriers to improvement:  none  Plan for Next Session:    Cont per POC and progress as able    Timed Code Treatment Minutes: 31 Minutes  PT Individual Minutes  Time In: 1042  Time Out: 1113  Minutes: 31              Previously filed items:  Social/Functional History  Lives 
Physical Therapy    Physical Therapy Treatment Note  Name: Kian Frias MRN: [id number] :   1960   Date:  2025   Admission Date: 2025 Room:  -A   Restrictions/Precautions:           Sternal precautions, falls   Communication with other providers:  per nurse ok to tx  Subjective:  Patient states:  agreeable to tx  Pain:   Location, Type, Intensity (0/10 to 10/10):  no c/o pain during tx session  Objective:    Observation:  alert and oriented  Objective Measures:  vitals WNL  Treatment, including education/measures:  Pt was instructed in Intermediate Cardiac ex program:  Overhead Side Stretch x 10  Ankle Pumps/ Heel Raises x 10 in standing  Marching standing x 10 in standing  Forward Arm Raises x 10  Side Arm Raises x 10  Arm Crosses x 10  Arm Circles Forwards and Backwards x 10  SittingTrunkTwist unable due to low back problems but able to do cer rotation 10 reps     Sit to sup cga to lift feet into bed   Sit<=>stand from chair, commode and to bed sba  Pt dependent for victor m care after having BM due to back issues and pt not wanting to reach between legs due to IVs.  Amb without assistive device 20' x 2. Pt had just finishes amb with nurse before tx session and declined longer walk.  Safety  Patient left safely in the bed, with call light/phone in reach with alarm applied. Gait belt was used for transfers and gait.   Assessment / Impression:      Patient's tolerance of treatment:  good   Adverse Reaction: na  Significant change in status and impact:  na  Barriers to improvement:  none  Plan for Next Session:    Cont. POC      Time in:  0840  Time out:  905  Timed treatment minutes:  25  Total treatment time:  25    Previously filed items:  Social/Functional History  Lives With:  (pt's sister/brother in law will stay in the house for at least 4 weeks)  Type of Home: House  Home Layout: One level, Laundry in basement  Home Access: Stairs to enter without rails  Entrance Stairs - Number of 
Pt assessed for extubation. Pt was alert and oriented and able to lift head from pillow w/out great effort. Pt was on spontaneous vent setting w/ pressure support of 12 for approximately 20 minutes. Weaning parameters were: RSBI 89, NIF -31. RT contacted team and was Ok'd to extubate.     Extubated pt at 1959 to home CPAP w/ 6L bleed in. Pt currently satting 94%. Provided pt with IS and instructed to continue deep breathing.    RUIZ Johnston RRT  
Pt following commands and able to lift head off pillow. Pt off precedex. Pt placed on spontaneous ventilator setting at 1800. RSBI 109, tidal volume 267, respiration 35. Pt instructed to slow breathing down. Pt now having periods of RR in the 30s or going apneic multiple times. Constanza RT remained at bedside. Pt placed back on ACVC at 1815 and did not tolerate SBT. Notified Dr. Cruz that pt has failed SBT twice.   
Pt returned from cath lab at this time with right groin site clean, dry, intact. BLE distal pulses palpable, capillary refill less than 3 seconds, and warm/appropriate color. Pt instructed on bedrest orders and prevention of bleeding from groin site. Pt verbalized understanding.   
Pt was switched to CPAP 15/5 40% at 1605.  Will continue to monitor this pt.  
Received this pt from the OR at 1304 with a size #8.0 ET tube secured 23 @ lip.  Commercial tube durham in place.  Vent settings:  ACVC  Rate:  16  VT:  550  FI02:  100%  PEEP:  +5  Initial ABG's:  pH:  7.41   C02:  36   Pa02:  284   HC03:  23   Sa02:  99%  Decreased the FI02 down to 60%.  Will continue to monitor this pt.  
Spiritual Health History and Assessment/Progress Note  [facility]    Spiritual/Emotional Needs,  ,  ,      Name: Kian Frias MRN: [id number]    Age: 64 y.o.     Sex: male   Language: English   Jewish: None   Coronary atherosclerosis of native coronary artery     Date: 2/6/2025            Total Time Calculated: 19 min              Spiritual Assessment began in [facility] CVICU        Referral/Consult From: Rounding   Encounter Overview/Reason: Spiritual/Emotional Needs  Service Provided For: Patient and family together    Lelo, Belief, Meaning:   Patient identifies as spiritual, is connected with a lelo tradition or spiritual practice, and has beliefs or practices that help with coping during difficult times  Family/Friends identify as spiritual, are connected with a lelo tradition or spiritual practice, and have beliefs or practices that help with coping during difficult times      Importance and Influence:  Patient has spiritual/personal beliefs that influence decisions regarding their health  Family/Friends have spiritual/personal beliefs that influence decisions regarding the patient's health    Community:  Patient feels well-supported. Support system includes: Extended family  Family/Friends feel well-supported. Support system includes: Spouse/Partner and Extended family    Assessment and Plan of Care:     Patient Interventions include: Facilitated expression of thoughts and feelings  Family/Friends Interventions include: Facilitated expression of thoughts and feelings    Patient Plan of Care: Spiritual Care available upon further referral  Family/Friends Plan of Care: Spiritual Care available upon further referral    Electronically signed by Chaplain Curtis on 2/6/2025 at 3:24 PM   
Yee MELCHORN RN clinical  for [facility] RN students 07-19:00 this date.   
Labs     02/05/25  1311 02/06/25  0430 02/07/25  0409   PROTIME 16.8* 15.8* 16.8*   INR 1.3 1.2 1.3     APTT:   Recent Labs     02/05/25  1311 02/06/25  0430 02/07/25  0409   APTT 31.4 29.5 29.4       Chest X-Ray: [unfilled]     Scheduled Meds:    metoprolol succinate  25 mg Oral Daily    insulin lispro  0-8 Units SubCUTAneous 4x Daily AC & HS    guaiFENesin  1,200 mg Oral BID    latanoprost  1 drop Both Eyes Nightly    pravastatin  10 mg Oral Nightly    sodium chloride flush  5-40 mL IntraVENous 2 times per day    aspirin  81 mg Oral Daily    clopidogrel  75 mg Oral Daily    chlorhexidine  15 mL Mouth/Throat BID    mupirocin   Each Nostril BID    multivitamin  1 tablet Oral Daily with breakfast    polyethylene glycol  17 g Oral Daily    sennosides-docusate sodium  1 tablet Oral BID    famotidine  20 mg Oral BID    Or    famotidine (PEPCID) injection  20 mg IntraVENous BID    ipratropium 0.5 mg-albuterol 2.5 mg  1 Dose Inhalation Q4H WA RT    gabapentin  300 mg Oral TID    bacitracin   Topical BID    acetaminophen  1,000 mg Oral 4 times per day    heparin (porcine)  5,000 Units SubCUTAneous 3 times per day    methocarbamol  500 mg Oral TID     Continuous Infusions:    sodium chloride 50 mL/hr at 02/07/25 0600    sodium chloride      phenylephrine Stopped (02/06/25 1200)    niCARdipine      dextrose             Physical Exam:    General: A&O x 3, NAD noted  Neck:  supple, no carotid bruits, no JVD  ENT: JAMIA  Heart: Normal S1, S2, RRR, No murmurs noted   Lungs: Diminished BS bilaterally at the bases.  Abdomen: Soft, non tender, non distended, Hypoactive BS x 4   : Dooley in place   Extremities: No edema noted bilateral LE.  Neuro: no focal deficits noted    : 514cc/24hrs    Assessment:    Mr. Frias is a 64 y.o. year-old male now status post CORONARY ARTERY BYPASS GRAFTING X2 (LIMA-LAD, SVG-PDA); ENDOSCOPIC HARVEST OF LEFT GREATER SAPHENOUS VEIN  on 2/5/25         Plan:    Lopressor 25mg bid  Lasix 40 IV daily with 
chlorhexidine  15 mL Mouth/Throat BID    mupirocin   Each Nostril BID    multivitamin  1 tablet Oral Daily with breakfast    polyethylene glycol  17 g Oral Daily    sennosides-docusate sodium  1 tablet Oral BID    famotidine  20 mg Oral BID    Or    famotidine (PEPCID) injection  20 mg IntraVENous BID    ipratropium 0.5 mg-albuterol 2.5 mg  1 Dose Inhalation Q4H WA RT    gabapentin  300 mg Oral TID    bacitracin   Topical BID    acetaminophen  1,000 mg Oral 4 times per day    heparin (porcine)  5,000 Units SubCUTAneous 3 times per day    methocarbamol  500 mg Oral TID      sodium chloride      phenylephrine Stopped (02/06/25 1200)    niCARdipine      dextrose       sodium chloride flush, potassium chloride **OR** potassium alternative oral replacement **OR** potassium chloride, magnesium sulfate, sodium phosphate 15 mmol in sodium chloride 0.9 % 250 mL IVPB, sodium chloride flush, sodium chloride, ondansetron **OR** ondansetron, [START ON 2/8/2025] hydrALAZINE, bisacodyl, potassium chloride, calcium chloride 1,000 mg in sodium chloride 0.9 % 100 mL IVPB **OR** calcium chloride 2,000 mg in sodium chloride 0.9 % 100 mL IVPB, albumin human 5%, phenylephrine, niCARdipine, glucose, dextrose bolus **OR** dextrose bolus, glucagon (rDNA), dextrose, traMADol    Lab Data:  CBC:   Recent Labs     02/05/25  1311 02/05/25  2235 02/06/25  0430 02/07/25  0409   WBC 11.0* 15.6* 15.7* 8.6   HGB 13.0* 14.2 13.7 12.0*   HCT 37.6* 41.3* 41.3* 36.3*   MCV 91.3 91.4 92.4 93.8    217 202  --      BMP:   Recent Labs     02/05/25  2235 02/05/25  2357 02/06/25  0417 02/06/25  0430 02/07/25  0409     --   --  144 137   K 4.5  --   --  4.6 4.2   *  --   --  111* 106   CO2 24  --   --  22 23   PHOS  --   --   --  3.3 1.9*   BUN 10  --   --  11 12   CREATININE 1.0   < > 0.9 0.9 1.0    < > = values in this interval not displayed.     LIVER PROFILE: No results for input(s): \"AST\", \"ALT\", \"LIPASE\", \"AMYLASE\", \"BILIDIR\", 
chloride **OR** potassium alternative oral replacement **OR** potassium chloride, magnesium sulfate, sodium phosphate 15 mmol in sodium chloride 0.9 % 250 mL IVPB, sodium chloride flush, sodium chloride, ondansetron **OR** ondansetron, hydrALAZINE, bisacodyl, potassium chloride, calcium chloride 1,000 mg in sodium chloride 0.9 % 100 mL IVPB **OR** calcium chloride 2,000 mg in sodium chloride 0.9 % 100 mL IVPB, albumin human 5%, phenylephrine, glucose, dextrose bolus **OR** dextrose bolus, glucagon (rDNA), dextrose, traMADol    Lab Data:  CBC:   Recent Labs     02/07/25  0409 02/08/25  0610 02/09/25  0820   WBC 8.6 8.3 6.0   HGB 12.0* 14.2 15.4   HCT 36.3* 43.4 46.9   MCV 93.8 93.9 92.5   PLT  --  160 205     BMP:   Recent Labs     02/07/25  1455 02/08/25  0610 02/09/25  0820    138 139   K 4.0 4.7 4.2    103 102   CO2 22 24 26   PHOS 1.4* 2.0* 2.4*   BUN 14 12 16   CREATININE 0.9 1.0 0.9     LIVER PROFILE: No results for input(s): \"AST\", \"ALT\", \"LIPASE\", \"AMYLASE\", \"BILIDIR\", \"BILITOT\", \"ALKPHOS\" in the last 72 hours.    Invalid input(s): \"ALB\"  PT/INR:   Recent Labs     02/07/25  0409 02/08/25  0610 02/09/25  0820   PROTIME 16.8* 14.5 13.8   INR 1.3 1.1 1.0     APTT:   Recent Labs     02/07/25  0409 02/08/25  0610 02/09/25  0820   APTT 29.4 29.6 29.0     BNP:  No results for input(s): \"BNP\" in the last 72 hours.  TROPONIN: No results for input(s): \"TROPONINI\" in the last 72 hours. No results for input(s): \"TROPONINT\" in the last 72 hours.     Labs, consult, tests reviewed                    Samra Alamo MD, PA-C 2/9/2025 10:49 AM     Please note this report has been partially produced using speech recognition software and may contain errors related to that system including errors in grammar, punctuation, and spelling, as well as words and phrases that may be inappropriate. If there are any questions or concerns please feel free to contact the dictating provider for clarification.       
Collection Time: 02/06/25  2:12 AM   Result Value Ref Range    POC Sodium 145 136 - 145 mmol/L    POC Potassium 4.4 3.5 - 5.1 mmol/L    POC Chloride 113 (H) 99 - 110 mmol/L    POC Anion Gap 8 7 - 16 mmol/L    POC Glucose 132 (H) 74 - 99 mg/dL    POC BUN 10 7 - 20 mg/dL    POC Creatinine 0.9 0.5 - 1.2 mg/dL    eGFR, POC >90 >60 mL/min/1.73m2    POC Ionized Calcium 1.26 1.15 - 1.33 mmol/L   POCT H&H    Collection Time: 02/06/25  2:12 AM   Result Value Ref Range    POC Hemoglobin (calc) 13.7 12 - 17 g/dL    POC Hematocrit 40 38 - 51 %   POCT Glucose    Collection Time: 02/06/25  3:12 AM   Result Value Ref Range    POC Glucose 111 (H) 74 - 99 mg/dL   POCT Glucose    Collection Time: 02/06/25  4:17 AM   Result Value Ref Range    POC Glucose 105 (H) 74 - 99 mg/dL   Basic Metabolic Panel    Collection Time: 02/06/25  4:30 AM   Result Value Ref Range    Sodium 144 136 - 145 mmol/L    Potassium 4.6 3.5 - 5.1 mmol/L    Chloride 111 (H) 99 - 110 mmol/L    CO2 22 21 - 32 mmol/L    Anion Gap 10 9 - 17 mmol/L    Glucose 116 (H) 74 - 99 mg/dL    BUN 11 7 - 20 mg/dL    Creatinine 0.9 0.8 - 1.3 mg/dL    Est, Glom Filt Rate >90 >60 mL/min/1.73m2    Calcium 8.6 8.3 - 10.6 mg/dL   CBC    Collection Time: 02/06/25  4:30 AM   Result Value Ref Range    WBC 15.7 (H) 4.0 - 10.5 k/uL    RBC 4.47 (L) 4.60 - 6.20 m/uL    Hemoglobin 13.7 13.5 - 18.0 g/dL    Hematocrit 41.3 (L) 42.0 - 52.0 %    MCV 92.4 78.0 - 100.0 fL    MCH 30.6 27.0 - 31.0 pg    MCHC 33.2 32.0 - 36.0 g/dL    RDW 13.5 11.7 - 14.9 %    Platelets 202 140 - 440 k/uL    MPV 10.6 7.5 - 11.1 fL   Protime-INR    Collection Time: 02/06/25  4:30 AM   Result Value Ref Range    Protime 15.8 (H) 11.7 - 14.5 sec    INR 1.2    APTT    Collection Time: 02/06/25  4:30 AM   Result Value Ref Range    APTT 29.5 25.1 - 37.1 sec   Fibrinogen    Collection Time: 02/06/25  4:30 AM   Result Value Ref Range    Fibrinogen 279 170 - 540 mg/dL   Calcium, Ionized    Collection Time: 02/06/25  4:30 AM

## 2025-02-09 NOTE — DISCHARGE INSTRUCTIONS
[facility] (inpatient): [phone]-[place] Monday-Friday 8-5PM.  Leave a message and they will return your call in a timely manner.    [facility] (outpatient):   [phone]  Cardiac Rehab will contact you within 6-8 weeks after discharge.    [employer] is a support group for cardiac patients.  They met the 3rd Tuesday of each month from 6-8pm in Assembly Room A on the ground level here at [facility].  (No meetings in June or July).  All cardiac patients are encouraged and welcome to attend.  Any questions, please contact [phone].     Thank you for choosing [facility] as your open heart care provider.  You may receive a telephone call from the Cardio Vascular Intensive Care Unit within 48 hours of discharge inquiring as to your health and care received during your hospitalization.      Please bring these instructions and a list of you medications  with you to your doctors' appointments.    Electronically signed by Brooke Bales RN on 2/9/2025 at 8:53 AM    Home Record Sheet    We appreciate it is difficult, but you should take blood pressure at least twice daily in the morning and evening.    For each blood pressure recording, take two consecutive measurements at least 1 minute apart and whilst you are seated.  Do this for a minimum of 4 days, ideally for 7 days.  Weigh your self every day at the same time each day with the same clothes on after you have urinated and before you eat. Call [phone] for any increase in weight of 2-3 pounds in one day or 5 pounds over 3 days.     Date Time Systolic  (upper value)  Diastolic  (lower value)  Weight today Pulse  BM                                                                                                                                                              Heart Failure Weight Tracking Sheet  Monitor my Weight Gain    Weighing yourself every is very important. A weight gain of 2-3 pounds over one day or 5 pounds

## 2025-02-10 PROBLEM — Z95.1 S/P CABG (CORONARY ARTERY BYPASS GRAFT): Status: ACTIVE | Noted: 2025-02-10

## 2025-02-11 ENCOUNTER — TELEPHONE (OUTPATIENT)
Dept: CARDIOTHORACIC SURGERY | Age: 65
End: 2025-02-11

## 2025-02-11 DIAGNOSIS — I25.10 CAD IN NATIVE ARTERY: Primary | ICD-10-CM

## 2025-02-11 NOTE — TELEPHONE ENCOUNTER
Patient had CABG on 2/5/2025 with Dr. Cruz. He was discharged on Sunday. He was calling the office to see when he was supposed to be seen as post op.   I reviewed his charge nothing was scheduled or xray and labs have not been ordered.     I scheduled patient post op on 2/18/2025    Patient needs xray and labs ordered.     He is aware e needs to go to the imaging center prior to his appt on 2/18/2025        Please place orders.

## 2025-02-15 PROBLEM — J10.1 INFLUENZA A: Status: RESOLVED | Noted: 2025-01-16 | Resolved: 2025-02-15

## 2025-02-17 NOTE — PROGRESS NOTES
Cardiothoracic Surgery   Postoperative Follow Up    Cardiologist:  Dr Michaels    Procedure:  CORONARY ARTERY BYPASS GRAFTING X2 (LIMA-LAD, SVG-PDA); ENDOSCOPIC HARVEST OF LEFT GREATER SAPHENOUS VEIN on 2/5/25      HISTORY OF PRESENT ILLNESS       Kian Frias is a 64 y.o. male who presents today for a postoperative follow up appointment.     Pt is doing well, continues to improve, increasing activity level as tolerated - no SOB or CP noted. Incisions healing well. Concerned about EVH site    OBJECTIVE   VITALS:  /66 (Site: Left Upper Arm, Position: Sitting, Cuff Size: Large Adult)   Pulse 72   Ht 1.778 m (5' 10\")   Wt 94.9 kg (209 lb 4 oz)   BMI 30.02 kg/m²      Physical Exam:  Gen: alert, well appearing, and in no distress, oriented to person, place, and time, and normal appearing weight  Chest:  Sternotomy site well approximated, clean and dry.  No signs of erythema, swelling, or drainage. Chest tube insertion sites clean and dry.  No signs of infection.  Lung: clear to auscultation, no wheezes or rales, and unlabored breathing  Heart: S1 and S2 normal, no murmur, click, gallop or rub  Extremities: peripheral pulses normal, no pedal edema, no clubbing or cyanosis- EVH site is clean and dry. Mild erythema around borders but does not appear infected. Well approximated.    Radiological and other results:  CXR: no pneumothorax or effusion        Assessment:     S/p CORONARY ARTERY BYPASS GRAFTING X2 (LIMA-LAD, SVG-PDA); ENDOSCOPIC HARVEST OF LEFT GREATER SAPHENOUS VEIN on 2/5/25        Plan     Follow up with Cardiology on 3/11/25  RTO in 2 weeks for wound check

## 2025-02-18 ENCOUNTER — OFFICE VISIT (OUTPATIENT)
Dept: CARDIOTHORACIC SURGERY | Age: 65
End: 2025-02-18

## 2025-02-18 ENCOUNTER — HOSPITAL ENCOUNTER (OUTPATIENT)
Dept: GENERAL RADIOLOGY | Age: 65
Discharge: HOME | End: 2025-02-18
Payer: MEDICARE

## 2025-02-18 ENCOUNTER — HOSPITAL ENCOUNTER (OUTPATIENT)
Age: 65
Discharge: HOME | End: 2025-02-18
Payer: MEDICARE

## 2025-02-18 VITALS
DIASTOLIC BLOOD PRESSURE: 66 MMHG | HEIGHT: 70 IN | HEART RATE: 72 BPM | WEIGHT: 209.25 LBS | SYSTOLIC BLOOD PRESSURE: 102 MMHG | BODY MASS INDEX: 29.96 KG/M2

## 2025-02-18 DIAGNOSIS — Z09 POSTOPERATIVE FOLLOW-UP: Primary | ICD-10-CM

## 2025-02-18 DIAGNOSIS — I25.10 CAD IN NATIVE ARTERY: ICD-10-CM

## 2025-02-18 LAB
ANION GAP SERPL CALCULATED.3IONS-SCNC: 11 MMOL/L (ref 9–17)
BASOPHILS # BLD: 0.04 K/UL
BASOPHILS NFR BLD: 0 % (ref 0–1)
BUN SERPL-MCNC: 13 MG/DL (ref 7–20)
CA-I BLD-SCNC: 1.29 MMOL/L (ref 1.15–1.33)
CALCIUM SERPL-MCNC: 10.4 MG/DL (ref 8.3–10.6)
CHLORIDE SERPL-SCNC: 101 MMOL/L (ref 99–110)
CO2 SERPL-SCNC: 27 MMOL/L (ref 21–32)
CREAT SERPL-MCNC: 0.9 MG/DL (ref 0.8–1.3)
EOSINOPHIL # BLD: 0.49 K/UL
EOSINOPHILS RELATIVE PERCENT: 5 % (ref 0–3)
ERYTHROCYTE [DISTWIDTH] IN BLOOD BY AUTOMATED COUNT: 13.2 % (ref 11.7–14.9)
GFR, ESTIMATED: 83 ML/MIN/1.73M2
GLUCOSE SERPL-MCNC: 96 MG/DL (ref 74–99)
HCT VFR BLD AUTO: 46.4 % (ref 42–52)
HGB BLD-MCNC: 15.3 G/DL (ref 13.5–18)
IMM GRANULOCYTES # BLD AUTO: 0.06 K/UL
IMM GRANULOCYTES NFR BLD: 1 %
LYMPHOCYTES NFR BLD: 0.93 K/UL
LYMPHOCYTES RELATIVE PERCENT: 10 % (ref 24–44)
MAGNESIUM SERPL-MCNC: 2.4 MG/DL (ref 1.8–2.4)
MCH RBC QN AUTO: 30.5 PG (ref 27–31)
MCHC RBC AUTO-ENTMCNC: 33 G/DL (ref 32–36)
MCV RBC AUTO: 92.6 FL (ref 78–100)
MONOCYTES NFR BLD: 1 K/UL
MONOCYTES NFR BLD: 10 % (ref 0–4)
NEUTROPHILS NFR BLD: 74 % (ref 36–66)
NEUTS SEG NFR BLD: 7.24 K/UL
PHOSPHATE SERPL-MCNC: 2.6 MG/DL (ref 2.5–4.9)
PLATELET # BLD AUTO: 344 K/UL (ref 140–440)
PMV BLD AUTO: 10.5 FL (ref 7.5–11.1)
POTASSIUM SERPL-SCNC: 4.4 MMOL/L (ref 3.5–5.1)
RBC # BLD AUTO: 5.01 M/UL (ref 4.6–6.2)
SODIUM SERPL-SCNC: 139 MMOL/L (ref 136–145)
WBC OTHER # BLD: 9.8 K/UL (ref 4–10.5)

## 2025-02-18 PROCEDURE — 71046 X-RAY EXAM CHEST 2 VIEWS: CPT

## 2025-02-18 PROCEDURE — 85025 COMPLETE CBC W/AUTO DIFF WBC: CPT

## 2025-02-18 PROCEDURE — 80048 BASIC METABOLIC PNL TOTAL CA: CPT

## 2025-02-18 PROCEDURE — 82330 ASSAY OF CALCIUM: CPT

## 2025-02-18 PROCEDURE — 99024 POSTOP FOLLOW-UP VISIT: CPT | Performed by: THORACIC SURGERY (CARDIOTHORACIC VASCULAR SURGERY)

## 2025-02-18 PROCEDURE — 84100 ASSAY OF PHOSPHORUS: CPT

## 2025-02-18 PROCEDURE — 83735 ASSAY OF MAGNESIUM: CPT

## 2025-03-10 NOTE — PROGRESS NOTES
Cardiothoracic Surgery   Postoperative Follow Up    Cardiologist:  Dr Michaels    Procedure:  CORONARY ARTERY BYPASS GRAFTING X2 (LIMA-LAD, SVG-PDA); ENDOSCOPIC HARVEST OF LEFT GREATER SAPHENOUS VEIN on 2/5/25      HISTORY OF PRESENT ILLNESS       Kian Frias is a 64 y.o. male who presents today for a postoperative follow up appointment.     Pt is doing well, continues to improve, increasing activity level as tolerated - no SOB or CP noted. Incisions healing well.   OBJECTIVE   VITALS:  /68 (BP Site: Left Upper Arm, Patient Position: Sitting, BP Cuff Size: Large Adult)   Pulse 67   Ht 1.778 m (5' 10\")   Wt 92.5 kg (204 lb)   SpO2 95%   BMI 29.27 kg/m²      Physical Exam:  Gen: alert, well appearing, and in no distress, oriented to person, place, and time, and normal appearing weight  Chest:  Sternotomy site well approximated, clean and dry.  No signs of erythema, swelling, or drainage. Chest tube insertion sites clean and dry.  No signs of infection.  Lung: clear to auscultation, no wheezes or rales, and unlabored breathing  Heart: S1 and S2 normal, no murmur, click, gallop or rub  Extremities: peripheral pulses normal, no pedal edema, no clubbing or cyanosis- EVH site is clean and dry. Mild erythema around borders but does not appear infected. Well approximated.    Radiological and other results:  CXR: no pneumothorax or effusion        Assessment:     S/p CORONARY ARTERY BYPASS GRAFTING X2 (LIMA-LAD, SVG-PDA); ENDOSCOPIC HARVEST OF LEFT GREATER SAPHENOUS VEIN on 2/5/25        Plan     Follow up with Cardiology this afternoon as scheduled  Reviewed sternal precautions  Follow up PRN

## 2025-03-11 ENCOUNTER — OFFICE VISIT (OUTPATIENT)
Dept: CARDIOTHORACIC SURGERY | Age: 65
End: 2025-03-11

## 2025-03-11 ENCOUNTER — OFFICE VISIT (OUTPATIENT)
Dept: CARDIOLOGY CLINIC | Age: 65
End: 2025-03-11
Payer: MEDICARE

## 2025-03-11 VITALS
OXYGEN SATURATION: 95 % | BODY MASS INDEX: 29.2 KG/M2 | SYSTOLIC BLOOD PRESSURE: 110 MMHG | DIASTOLIC BLOOD PRESSURE: 68 MMHG | HEIGHT: 70 IN | WEIGHT: 204 LBS | HEART RATE: 67 BPM

## 2025-03-11 VITALS
HEIGHT: 70 IN | BODY MASS INDEX: 29.2 KG/M2 | DIASTOLIC BLOOD PRESSURE: 76 MMHG | SYSTOLIC BLOOD PRESSURE: 116 MMHG | WEIGHT: 204 LBS | HEART RATE: 63 BPM

## 2025-03-11 DIAGNOSIS — I47.10 SVT (SUPRAVENTRICULAR TACHYCARDIA): ICD-10-CM

## 2025-03-11 DIAGNOSIS — E11.9 TYPE 2 DIABETES MELLITUS WITHOUT COMPLICATION, WITHOUT LONG-TERM CURRENT USE OF INSULIN (HCC): ICD-10-CM

## 2025-03-11 DIAGNOSIS — I25.10 ATHEROSCLEROSIS OF NATIVE CORONARY ARTERY OF NATIVE HEART WITHOUT ANGINA PECTORIS: ICD-10-CM

## 2025-03-11 DIAGNOSIS — I21.02 ST ELEVATION MYOCARDIAL INFARCTION INVOLVING LEFT ANTERIOR DESCENDING (LAD) CORONARY ARTERY (HCC): ICD-10-CM

## 2025-03-11 DIAGNOSIS — Z09 POSTOPERATIVE FOLLOW-UP: Primary | ICD-10-CM

## 2025-03-11 DIAGNOSIS — Z95.1 S/P CABG (CORONARY ARTERY BYPASS GRAFT): Primary | ICD-10-CM

## 2025-03-11 DIAGNOSIS — I25.10 ASCVD (ARTERIOSCLEROTIC CARDIOVASCULAR DISEASE): ICD-10-CM

## 2025-03-11 PROCEDURE — 1124F ACP DISCUSS-NO DSCNMKR DOCD: CPT | Performed by: INTERNAL MEDICINE

## 2025-03-11 PROCEDURE — 93000 ELECTROCARDIOGRAM COMPLETE: CPT | Performed by: INTERNAL MEDICINE

## 2025-03-11 PROCEDURE — 3044F HG A1C LEVEL LT 7.0%: CPT | Performed by: INTERNAL MEDICINE

## 2025-03-11 PROCEDURE — 99024 POSTOP FOLLOW-UP VISIT: CPT | Performed by: PHYSICIAN ASSISTANT

## 2025-03-11 PROCEDURE — 99214 OFFICE O/P EST MOD 30 MIN: CPT | Performed by: INTERNAL MEDICINE

## 2025-03-11 RX ORDER — PRAVASTATIN SODIUM 40 MG
40 TABLET ORAL NIGHTLY
Qty: 90 TABLET | Refills: 3 | Status: SHIPPED | OUTPATIENT
Start: 2025-03-11

## 2025-03-11 RX ORDER — CLOPIDOGREL BISULFATE 75 MG/1
75 TABLET ORAL DAILY
Qty: 90 TABLET | Refills: 3 | Status: SHIPPED | OUTPATIENT
Start: 2025-03-11

## 2025-03-11 RX ORDER — METOPROLOL TARTRATE 50 MG
50 TABLET ORAL 2 TIMES DAILY
Qty: 60 TABLET | Refills: 2 | Status: SHIPPED | OUTPATIENT
Start: 2025-03-11

## 2025-03-11 NOTE — PROGRESS NOTES
CARDIOLOGY  NOTE    Chief Complaint: ASCVD    HPI:   Kian is a 64 y.o. year old who has Past medical history as noted below  Walter states that he stopped taking full dose Plavix and actually got started cutting it have because he was having epistaxis like feeling with blood trickling down his throat.  He still taking aspirin 81 mg daily.  He had CABG with LIMA to LAD and SVG to PDA in February 2025 after he presented to the hospital due to concerns for SVT leading to elevated troponin levels he was having chest pains.  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  He has no ankle swelling denies any chest pain he is trying to get more active but he would prefer not to go to cardiac rehab        Current Outpatient Medications   Medication Sig Dispense Refill    clopidogrel (PLAVIX) 75 MG tablet Take 1 tablet by mouth daily 90 tablet 3    pravastatin (PRAVACHOL) 40 MG tablet Take 1 tablet by mouth nightly 90 tablet 3    metoprolol tartrate (LOPRESSOR) 50 MG tablet Take 1 tablet by mouth 2 times daily 60 tablet 2    metFORMIN (GLUCOPHAGE) 500 MG tablet Take 1 tablet by mouth 2 times daily (with meals) 60 tablet 3    latanoprost (XALATAN) 0.005 % ophthalmic solution Place 1 drop into both eyes nightly       No current facility-administered medications for this visit.       Allergies:   Zocor [simvastatin]    Patient History:  Past Medical History:   Diagnosis Date    Coronary artery disease 01/15/2025    Heart murmur     \"as a baby assume I outgrew it \"    Hyperlipidemia     Kidney stone     \"one removed 20 yrs ago, passed a  stone 10 yrs ago- and have them in both kidneys now\"( 8/2017)    NSTEMI (non-ST elevated myocardial infarction) (HCC) 01/15/2025    DAJUAN (obstructive sleep apnea)     uses CPAP    Sleep apnea     had sleep study done 5 + yrs ago- has cpap    SVT (supraventricular tachycardia) 01/15/2025    Type 2 diabetes mellitus without complication ([facility])

## 2025-03-11 NOTE — ASSESSMENT & PLAN NOTE
S/p CABG with LIMA to LAD and SVG to PDA in February 2025.  He had cardiac cath due to concerns for ST elevation but was noted to have patent grafts.  Advised him to take Plavix 75 mg daily if he is not able to tolerate both aspirin and Plavix due to epistaxis.  If he continues to have more episode epistaxis refer to ENT.  Continue metoprolol 50 twice daily he can return to work with weight restrictions of 15 pounds he does not want to go to cardiac rehab

## 2025-03-11 NOTE — PATIENT INSTRUCTIONS
Thank you for allowing us to care for you today!   We want to ensure we can follow your treatment plan and we strive to give you the best outcomes and experience possible.   If you ever have a life threatening emergency and call 911 - for an ambulance (EMS)  REMEMBER  Our providers can only care for you at:   [facility] or [facility]   Even if you have someone take you or you drive yourself we can only care for you in a [facility] facility. Our providers are not setup at the other healthcare locations!    PLEASE CALL OUR OFFICE DURING NORMAL BUSINESS HOURS  Monday through Friday 8 am to 5 pm  AFTER HOURS the physician on-call cannot help with scheduling, rescheduling, procedure instruction questions or any type of medication need or issue.  [facility] P:[phone] - [facility] P:[phone] - [facility] P:[phone]      If you receive a survey:  We would appreciate you taking the time to share your experience concerning your provider visit in the office.    These surveys are confidential!  We are eager to improve and are counting on you to share your feedback so we can ensure you get the best care possible.

## 2025-03-11 NOTE — PROGRESS NOTES
CLINICAL STAFF DOCUMENTATION    Dr. Florencio Frias  1960  [id number]    Have you had any Chest Pain recently? - No        Have you had any Shortness of Breath - No    Have you had any dizziness - No    Have you had any palpitations recently? - No    Do you have any edema - swelling in No        When did you have your last labs drawn 2/25  What doctor ordered Merrick  Do we have the labs in their chart Yes    Do you have a surgery or procedure scheduled in the near future - No    Do use tobacco products? - No  Do you drink alcohol? - Yes  Do you use any illicit drugs? - No  Caffeine? -  No

## 2025-04-08 RX ORDER — METOPROLOL TARTRATE 50 MG
50 TABLET ORAL 2 TIMES DAILY
Qty: 180 TABLET | Refills: 1 | Status: SHIPPED | OUTPATIENT
Start: 2025-04-08

## 2025-04-21 ENCOUNTER — TELEPHONE (OUTPATIENT)
Dept: CARDIOTHORACIC SURGERY | Age: 65
End: 2025-04-21

## 2025-04-21 NOTE — TELEPHONE ENCOUNTER
Pt wants to know what restrictions he still has, on lifting etc... He knows he has been released form our office and seeing his PCP and cardiology. He just wants to hear recommendations from our office since Dr. Cruz did the surgery.          , and ribs ache at times.   When sneezing it catches him off guard.     Can he get on the riding lown mower and mow his grass?

## 2025-06-20 NOTE — PROGRESS NOTES
MRN: [id number]  Name: Kian Frias  : 1960    Insurance: Payor: [facility] MEDICARE /  /  /      Phone #: [phone]  Provider: Florencio Michaels MD     Date of Visit: 2025    Reason for visit:  3 MONTH FOLLOW UP Recent Hospitalization Date:    Reason for Hospitalization:    Last EKG: 3/11/2025  Type of Device:       Vitals BP HR O2% WT HT ORTHO BP LYING ORTHO BP SITTING ORTHO BP SITTING   Today's Findings           Patients work up- Check List     Testing Last Date Completed Date Expected  ([facility]) Additional Notes    MA to document For provider to complete Either MA or Provider    Carotid Duplex 2025 STAT 1 WK 6 MTH       THIS WK 2 WK 1 YEAR     Cardiac CTA  STAT 1 WK 6 MTH       THIS WK 2 WK 1 YEAR     Cardiac CT Calcium scoring  STAT 1 WK 6 MTH       THIS WK 2 WK 1 YEAR     CTA Chest, Abdomen & Pelvis  STAT 1 WK 6 MTH       THIS WK 2 WK 1 YEAR     CT Chest IV w/ Contrast  STAT 1 WK 6 MTH       THIS WK 2 WK 1 YEAR     CT Chest w/o Contrast  STAT 1 WK 6 MTH       THIS WK 2 WK 1 YEAR     CXR  STAT 1 WK 6 MTH       THIS WK 2 WK 1 YEAR     ECHO 2025 Stress Complete Limited     MRI- Cardiac  STAT 1 WK 6 MTH       THIS WK 2 WK 1 YEAR     MUGA Scan  STAT 1 WK 6 MTH       THIS WK 2 WK 1 YEAR     Nuclear Stress  Lexiscan Cardiolite     PFT  STAT 1 WK 6 MTH       THIS WK 2 WK 1 YEAR     Treadmill Stress Test  STAT 1 WK 6 MTH       THIS WK 2 WK 1 YEAR     Vascular Duplex 2025 Lower: Right Left Bilat       Upper: Right Left Bilat     Other Test Not Listed:    Monitors Last Date Completed Day's Request/Ordered     Holter  Short term 24 hours 48 hours      Long term 3 days 7 days 14 days   Event   (1-30 days)      Procedures Last Date Performed Procedure Details Date Expected   Additional Notes    ASD Closure        Carotid Angio        Cardioversion        Heart Cath 2025 R  L R&L      Peripheral Angio  R L      PFO Closure        PTCA/PCI        KATELYN        KATELYN/Cardioversion        Venogram

## 2025-06-23 ENCOUNTER — OFFICE VISIT (OUTPATIENT)
Dept: CARDIOLOGY CLINIC | Age: 65
End: 2025-06-23
Payer: MEDICARE

## 2025-06-23 VITALS
HEART RATE: 69 BPM | BODY MASS INDEX: 29.4 KG/M2 | SYSTOLIC BLOOD PRESSURE: 124 MMHG | WEIGHT: 210 LBS | DIASTOLIC BLOOD PRESSURE: 76 MMHG | OXYGEN SATURATION: 97 % | HEIGHT: 71 IN

## 2025-06-23 DIAGNOSIS — E11.9 TYPE 2 DIABETES MELLITUS WITHOUT COMPLICATION, WITHOUT LONG-TERM CURRENT USE OF INSULIN (HCC): ICD-10-CM

## 2025-06-23 DIAGNOSIS — I21.02 ST ELEVATION MYOCARDIAL INFARCTION INVOLVING LEFT ANTERIOR DESCENDING (LAD) CORONARY ARTERY (HCC): ICD-10-CM

## 2025-06-23 DIAGNOSIS — Z95.1 S/P CABG (CORONARY ARTERY BYPASS GRAFT): ICD-10-CM

## 2025-06-23 DIAGNOSIS — I47.10 SVT (SUPRAVENTRICULAR TACHYCARDIA): ICD-10-CM

## 2025-06-23 DIAGNOSIS — I25.720 ATHEROSCLEROSIS OF AUTOLOGOUS ARTERY CORONARY ARTERY BYPASS GRAFT WITH UNSTABLE ANGINA PECTORIS (HCC): ICD-10-CM

## 2025-06-23 DIAGNOSIS — I25.10 ATHEROSCLEROSIS OF NATIVE CORONARY ARTERY OF NATIVE HEART WITHOUT ANGINA PECTORIS: ICD-10-CM

## 2025-06-23 DIAGNOSIS — I25.10 ASCVD (ARTERIOSCLEROTIC CARDIOVASCULAR DISEASE): Primary | ICD-10-CM

## 2025-06-23 DIAGNOSIS — N20.1 URETERAL STONE: ICD-10-CM

## 2025-06-23 PROCEDURE — 3044F HG A1C LEVEL LT 7.0%: CPT | Performed by: INTERNAL MEDICINE

## 2025-06-23 PROCEDURE — 99214 OFFICE O/P EST MOD 30 MIN: CPT | Performed by: INTERNAL MEDICINE

## 2025-06-23 PROCEDURE — 1124F ACP DISCUSS-NO DSCNMKR DOCD: CPT | Performed by: INTERNAL MEDICINE

## 2025-06-23 NOTE — PATIENT INSTRUCTIONS
Thank you for allowing us to care for you today!   We want to ensure we can follow your treatment plan and we strive to give you the best outcomes and experience possible.   If you ever have a life threatening emergency and call 911 - for an ambulance (EMS)  REMEMBER  Our providers can only care for you at:   [facility] or [facility]   Even if you have someone take you or you drive yourself we can only care for you in a [facility] facility. Our providers are not setup at the other healthcare locations!    PLEASE CALL OUR OFFICE DURING NORMAL BUSINESS HOURS  Monday through Friday 8 am to 5 pm  AFTER HOURS the physician on-call cannot help with scheduling, rescheduling, procedure instruction questions or any type of medication need or issue.  [facility] P:[phone] - [facility] P:[phone] - [facility] P:[phone]      If you receive a survey:  We would appreciate you taking the time to share your experience concerning your provider visit in the office.    These surveys are confidential!  We are eager to improve and are counting on you to share your feedback so we can ensure you get the best care possible.

## 2025-06-23 NOTE — PROGRESS NOTES
including errors in grammar, punctuation, and spelling, as well as words and phrases that may be inappropriate. If there are any questions or concerns please feel free to contact the dictating provider for clarification.

## 2025-06-28 ENCOUNTER — HOSPITAL ENCOUNTER (OUTPATIENT)
Age: 65
Discharge: HOME | End: 2025-06-28
Payer: MEDICARE

## 2025-06-28 DIAGNOSIS — I25.720 ATHEROSCLEROSIS OF AUTOLOGOUS ARTERY CORONARY ARTERY BYPASS GRAFT WITH UNSTABLE ANGINA PECTORIS (HCC): ICD-10-CM

## 2025-06-28 DIAGNOSIS — I25.10 ASCVD (ARTERIOSCLEROTIC CARDIOVASCULAR DISEASE): ICD-10-CM

## 2025-06-28 DIAGNOSIS — I25.10 ATHEROSCLEROSIS OF NATIVE CORONARY ARTERY OF NATIVE HEART WITHOUT ANGINA PECTORIS: ICD-10-CM

## 2025-06-28 LAB
CHOLEST SERPL-MCNC: 152 MG/DL (ref 125–199)
HDLC SERPL-MCNC: 53 MG/DL
LDLC SERPL CALC-MCNC: 81 MG/DL
TRIGL SERPL-MCNC: 94 MG/DL

## 2025-06-28 PROCEDURE — 80061 LIPID PANEL: CPT

## 2025-06-30 ENCOUNTER — RESULTS FOLLOW-UP (OUTPATIENT)
Dept: CARDIOLOGY CLINIC | Age: 65
End: 2025-06-30

## 2025-07-10 NOTE — TELEPHONE ENCOUNTER
LM for call back.       ----- Message from NICOLE Lyn CNP sent at 6/30/2025  7:07 PM EDT -----  Result is improved but not at goal  Recommend increase fiber  IF he would like to start zetia we can try.     1 Result Note       View Follow-Up Encounter       1  Topic       Component  Ref Range & Units (hover) 6/28/25 0811 1/15/25 0330   Cholesterol, Total 152 155   HDL 53 45   LDL Cholesterol 81 98   Triglycerides 94 62   Resulting Agency  - [facility]

## 2025-07-10 NOTE — TELEPHONE ENCOUNTER
Returned call, advising pt about Romi's note. Pt states he will hold off on the Zetia at the moment and will try to increase more fiber into his diet. Pt voiced understanding.

## (undated) DEVICE — APPLICATOR MEDICATED 26 CC SOLUTION HI LT ORNG CHLORAPREP

## (undated) DEVICE — SET ADMIN PRIMING 67ML L105IN NVENT 180UM FLTR 3 RLER CLMP

## (undated) DEVICE — GOWN,SLEEVE,STERILE,W/CSR WRAP,1/P: Brand: MEDLINE

## (undated) DEVICE — Device

## (undated) DEVICE — ANGIOGRAPHY KIT CUST MANIFOLD

## (undated) DEVICE — Device: Brand: VIRTUOSAPH PLUS WITH RADIAL INDICATION

## (undated) DEVICE — MARKER SURG SKIN UTIL REGULAR/FINE 2 TIP W/ RUL AND 9 LBL

## (undated) DEVICE — GOWN,ECLIPSE,POLYRNF,BRTHSLV,XL,30/CS: Brand: MEDLINE

## (undated) DEVICE — MINI STICK MAX COAXIAL MICROINTRODUCER KIT: Brand: ANGIODYNAMICS

## (undated) DEVICE — SUTURE PROL SZ 4-0 L36IN NONABSORBABLE BLU L26MM SH 1/2 CIR 8521H

## (undated) DEVICE — KIT COMPL CK0289R4  BB9L99R8

## (undated) DEVICE — CANNULA PERF L5.5IN DIA9FR AORT ROOT AG STD TIP W/ VENT LN

## (undated) DEVICE — OPEN HEART A: Brand: MEDLINE INDUSTRIES, INC.

## (undated) DEVICE — CATHETER ANGIO 4FR L100CM S STL NYL JL4 3 SEG BRAID SFT

## (undated) DEVICE — SUTURE VICRYL SZ 4-0 L18IN ABSRB UD L19MM PS-2 3/8 CIR PRIM J496H

## (undated) DEVICE — PREVENA INCISION MANAGEMENT SYSTEM- PEEL & PLACE DRESSING: Brand: PREVENA™ PEEL & PLACE™

## (undated) DEVICE — RESUSCITATOR,MANUAL,ADLT,MASK,TUBE RES: Brand: MEDLINE INDUSTRIES, INC.

## (undated) DEVICE — BAG TRNSF AUTOLGS SUCT AND ANTICOAG LN AUTOLOG

## (undated) DEVICE — CANNULA PERF ART 20 FRX8 IN 3/8 IN VENTED W/O FLANGE DLP

## (undated) DEVICE — SUTURE VICRYL 2-0 L36IN ABSRB UD CTX L48MM 1/2 CIR TAPERPOINT J979H

## (undated) DEVICE — 6 FOOT DISPOSABLE EXTENSION CABLE WITH SAFE CONNECT / SCREW-DOWN

## (undated) DEVICE — SET ADMIN L105IN 10GTT 3 NDL FREE CK VLV 2 PC M LUERLOCK

## (undated) DEVICE — SENSOR PLSE OXMTR AD CBL L3FT ADH TRANSMISSIVE

## (undated) DEVICE — BLANKET WRM W35.4XL86.6IN FULL UNDERBODY + FORC AIR

## (undated) DEVICE — GLOVE SURG SZ 65 CRM LTX FREE POLYISOPRENE POLYMER BEAD ANTI

## (undated) DEVICE — SENSOR OXMTR SM AD DISP FOR INVOS SYS

## (undated) DEVICE — CATHETER IV 22GA L1IN OD0.8382-0.9144MM ID0.6096-0.6858MM 382523

## (undated) DEVICE — DEVICE RESUS AD TB L40IN SELF INFL MASK TEXT BG DBL SWVL EL

## (undated) DEVICE — CABG ACCESSORY: Brand: MEDLINE INDUSTRIES, INC.

## (undated) DEVICE — CANNULA PERF VEN 36/46 FRX15 IN TWO STG OVL BSKT MC2

## (undated) DEVICE — GUIDEWIRE VASC L150CM DIA0.035IN FLX END L7CM J 3MM PTFE

## (undated) DEVICE — AGENT HEMSTAT 3GM OXIDIZED REGENERATED CELOS ABSRB FOR CONT (ORDER MULTIPLES OF 5EA)

## (undated) DEVICE — RADIFOCUS OPTITORQUE ANGIOGRAPHIC CATHETER: Brand: OPTITORQUE

## (undated) DEVICE — ANGIOGRAPHIC CATHETER: Brand: EXPO™

## (undated) DEVICE — SUTURE ETHIBOND SZ 2-0 L30IN NONABSORBABLE GRN V-7 L26MM 1/2 X977H

## (undated) DEVICE — CANNULA PERF 32/40FR L15IN NVENT 1/2IN CONN TWO STG VEN

## (undated) DEVICE — Z DSICONTINUED USE 2881387 SUTURE PROL SZ 5-0 L36IN NONABSORBABLE BLU L13MM C-1 3/8 8720H

## (undated) DEVICE — 3M™ STERI-DRAPE™ INSTRUMENT POUCH 1018: Brand: STERI-DRAPE™

## (undated) DEVICE — CONNECTOR DRNGE W3/8-0.5XH3/16XL3/16IN 2:1 SIL CARD STR

## (undated) DEVICE — DRAIN,WOUND,ROUND,24FR,5/16",FULL-FLUTED: Brand: MEDLINE

## (undated) DEVICE — OPEN HEART B: Brand: MEDLINE INDUSTRIES, INC.

## (undated) DEVICE — CATHETER ANGIO 6FR L100CM DIA0.056IN AL1 CRV VASC ACCS EXPO

## (undated) DEVICE — PINNACLE INTRODUCER SHEATH: Brand: PINNACLE

## (undated) DEVICE — CLIP SM RED INTERN HMOCLP TITAN LIGATING

## (undated) DEVICE — BLADE OPHTH D5MM 15DEG GRN W/ RND KNURLED HNDL MICRO-SHARP

## (undated) DEVICE — 1LYRTR 16FR10ML100%SILTMPS SNP: Brand: MEDLINE INDUSTRIES, INC.

## (undated) DEVICE — STERILE LATEX POWDER FREE SURGICAL GLOVES WITH HYDROGEL COATING: Brand: PROTEXIS

## (undated) DEVICE — SUTURE SZ 7 L18IN NONABSORBABLE SIL CCS L48MM 1/2 CIR STRNM M655G

## (undated) DEVICE — CATHETER CV KT 9 FRX11.5 CM DL FOR 7.5-8 FR INTRO NDL MAC

## (undated) DEVICE — SUTURE VICRYL SZ 3-0 L36IN ABSRB UD L36MM CT-1 1/2 CIR J944H

## (undated) DEVICE — DISK-SHAPED STYLE, SILICONE (1 PER STERILE PKG): Brand: SCANLAN® RADIOMARK® GRAFT MARKERS

## (undated) DEVICE — GOWN,ECLIPSE,POLYRNF,BRTHSLV,L,30/CS: Brand: MEDLINE

## (undated) DEVICE — PLEDGET VASC W3/16XL3/8IN THK1/16IN PTFE SFT

## (undated) DEVICE — CATHETER IV SHIELDED 1.77 INX16 GA 1.4X1.7 MM BLD CTRL GRY

## (undated) DEVICE — Z DUPLICATE USE 2891725 CLIP LIG M BLU TI HRT SHP WIRE HORZ 600 PER BX

## (undated) DEVICE — SUTURE PROL SZ 3-0 L36IN NONABSORBABLE BLU L26MM SH 1/2 CIR 8522H

## (undated) DEVICE — CANNULA 96600 117 BIO MEDICUS 17FR EA

## (undated) DEVICE — SUTURE PROL SZ 6-0 L30IN NONABSORBABLE BLU L13MM RB-2 1/2 8711H

## (undated) DEVICE — AGENT HEMSTAT W6XL9IN OXIDIZED REGENERATED CELOS ABSRB FOR

## (undated) DEVICE — DECANTER BAG 9": Brand: MEDLINE INDUSTRIES, INC.

## (undated) DEVICE — 4-PORT MANIFOLD: Brand: NEPTUNE 2

## (undated) DEVICE — AGENT HEMOSTATIC SURGIFLOW MATRIX KIT W/THROMBIN

## (undated) DEVICE — LEAD PACE L475MM CHNL A OR V MYOCARDIAL STEROID ELUT SIL

## (undated) DEVICE — TOWEL,OR,DSP,ST,BLUE,STD,6/PK,12PK/CS: Brand: MEDLINE

## (undated) DEVICE — DRAIN SURG SGL COLL PT TB FOR ATS BG OASIS

## (undated) DEVICE — GUIDEWIRE VASC L1775IN DIA0035IN STR SFT TIP BOTH END SPR

## (undated) DEVICE — BLADE OPHTH 180DEG CUT SURF BLU STR SHRP DBL BVL GRINDLESS

## (undated) DEVICE — GAUZE,SPONGE,4"X4",8PLY,STRL,LF,10/TRAY: Brand: MEDLINE

## (undated) DEVICE — GLOVE ORANGE PI 7 1/2   MSG9075

## (undated) DEVICE — SUTURE NRLN SZ 1 L18IN NONABSORBABLE BLK L36MM CT-1 1/2 CIR C520D

## (undated) DEVICE — ADAPTER Y TYP COR PERF FEM LUER W WHT CLMP

## (undated) DEVICE — DRAIN SURG L3/8-1/2IN DIA3/16IN SIL CARD CONN 1:1 BLAK

## (undated) DEVICE — SUTURE NONABSORBABLE MONOFILAMENT 7-0 BV-1 1X24 IN PROLENE 8702H

## (undated) DEVICE — SYRINGE MED 30ML STD CLR PLAS LUERLOCK TIP N CTRL DISP

## (undated) DEVICE — GUIDEWIRE VASC L260CM DIA0.035IN RAD 3MM J TIP L7CM PTFE

## (undated) DEVICE — SUTURE VICRYL SZ 1 L36IN ABSRB UD CTX L48MM 1/2 CIR J977H

## (undated) DEVICE — GLIDESHEATH SLENDER ACCESS KIT: Brand: GLIDESHEATH SLENDER

## (undated) DEVICE — SUTURE PERMA-HAND 1 L30IN NONABSORBABLE BLK SILK BRAID W/O SA87G

## (undated) DEVICE — SUTURE MONOCRYL SZ 3-0 L27IN ABSRB UD L24MM PS-1 3/8 CIR PRIM Y936H

## (undated) DEVICE — MEDI-TRACE CADENCE ADULT, DEFIBRILLATION ELECTRODE -RTS  (10 PR/PK) - ZOLL: Brand: MEDI-TRACE CADENCE

## (undated) DEVICE — BAND COMPR L24CM REG CLR PLAS HEMSTAT EXT HK AND LOOP RETEN

## (undated) DEVICE — GLOVE ORANGE PI 7   MSG9070

## (undated) DEVICE — DRESSING TEGADERM MATRX 4X5 STRL

## (undated) DEVICE — CATHETER DIAG AD 4FR L100CM STD NYL JUDKINS R 4 TRULUMEN